# Patient Record
Sex: FEMALE | Race: WHITE | Employment: FULL TIME | URBAN - METROPOLITAN AREA
[De-identification: names, ages, dates, MRNs, and addresses within clinical notes are randomized per-mention and may not be internally consistent; named-entity substitution may affect disease eponyms.]

---

## 2017-09-23 ENCOUNTER — HOSPITAL ENCOUNTER (OUTPATIENT)
Age: 42
Discharge: HOME | End: 2017-09-23
Attending: FAMILY MEDICINE
Payer: COMMERCIAL

## 2017-09-23 VITALS
HEART RATE: 86 BPM | SYSTOLIC BLOOD PRESSURE: 96 MMHG | DIASTOLIC BLOOD PRESSURE: 70 MMHG | TEMPERATURE: 97 F | OXYGEN SATURATION: 100 % | RESPIRATION RATE: 20 BRPM

## 2017-09-23 DIAGNOSIS — R53.83 FATIGUE, UNSPECIFIED TYPE: ICD-10-CM

## 2017-09-23 DIAGNOSIS — R05.9 COUGH: Primary | ICD-10-CM

## 2017-09-23 PROCEDURE — 99203 OFFICE O/P NEW LOW 30 MIN: CPT

## 2017-09-23 PROCEDURE — 99204 OFFICE O/P NEW MOD 45 MIN: CPT

## 2017-09-23 RX ORDER — AZITHROMYCIN 250 MG/1
TABLET, FILM COATED ORAL
Qty: 1 PACKAGE | Refills: 0 | Status: SHIPPED | OUTPATIENT
Start: 2017-09-23 | End: 2017-09-28

## 2017-09-23 RX ORDER — ALBUTEROL SULFATE 90 UG/1
2 AEROSOL, METERED RESPIRATORY (INHALATION) EVERY 4 HOURS PRN
Qty: 1 INHALER | Refills: 0 | Status: SHIPPED | OUTPATIENT
Start: 2017-09-23 | End: 2017-10-23

## 2017-09-23 NOTE — ED INITIAL ASSESSMENT (HPI)
Pt here with a cough since Tuesday, pt states she was running low grade fever and has been feeling weak and tired

## 2017-09-23 NOTE — ED PROVIDER NOTES
Patient Seen in: [address]    History   Patient presents with:  Cough/URI    Stated Complaint: cough    HPI    Patient here with cough, congestion for 4 days. No travel, no known sick contacts.   Patient denies sig shortn murmur  EXTREMITIES: no cyanosis, clubbing or edema  GI: soft, non-tender, normal bowel sounds  SKIN: good skin turgor, no obvious rashes  Differential to include: URI vs. rhinonsinusitis vs. Bronchitis vs. Pneumonia         ED Course   Labs Reviewed - No

## 2017-09-23 NOTE — ED NOTES
Pt discharged home, prescriptions given pt instructed to follow up with primary md if symptoms to not improve

## 2017-11-02 ENCOUNTER — HOSPITAL ENCOUNTER (OUTPATIENT)
Age: 42
Discharge: HOME | End: 2017-11-02
Attending: EMERGENCY MEDICINE
Payer: COMMERCIAL

## 2017-11-02 VITALS
OXYGEN SATURATION: 100 % | HEART RATE: 75 BPM | RESPIRATION RATE: 16 BRPM | TEMPERATURE: 98 F | SYSTOLIC BLOOD PRESSURE: 119 MMHG | DIASTOLIC BLOOD PRESSURE: 73 MMHG | WEIGHT: 150 LBS

## 2017-11-02 DIAGNOSIS — J06.9 VIRAL UPPER RESPIRATORY TRACT INFECTION: Primary | ICD-10-CM

## 2017-11-02 PROCEDURE — 99213 OFFICE O/P EST LOW 20 MIN: CPT

## 2017-11-02 PROCEDURE — 99212 OFFICE O/P EST SF 10 MIN: CPT

## 2017-11-02 NOTE — ED PROVIDER NOTES
Patient Seen in: [address]    History   No chief complaint on file. Stated Complaint: ear infection    HPI    The patient has had rhinorrhea and cough for several days.   This morning she awakened with pain in her rig No respiratory distress. Musculoskeletal: Normal range of motion. No edema or tenderness. Neurological: Oriented x 3, alert. No cranial nerve deficit. motor and sensory grossly normal, mood and affect normal   Skin: Skin is warm and dry. No rash noted.

## 2017-11-02 NOTE — ED INITIAL ASSESSMENT (HPI)
Pt here to IC with c/o right side ear pain. Resp easy and regular. Pt was here on sept. 23 got an antibiotic but never filled it (just wants us to know), but since then on/off feeling of congestion, sinus pressure, not feeling well and awoke this am with

## 2017-11-08 ENCOUNTER — HOSPITAL ENCOUNTER (OUTPATIENT)
Age: 42
Discharge: HOME | End: 2017-11-08
Attending: EMERGENCY MEDICINE
Payer: COMMERCIAL

## 2017-11-08 VITALS
DIASTOLIC BLOOD PRESSURE: 75 MMHG | HEART RATE: 71 BPM | HEIGHT: 67 IN | RESPIRATION RATE: 18 BRPM | SYSTOLIC BLOOD PRESSURE: 122 MMHG | OXYGEN SATURATION: 100 % | TEMPERATURE: 98 F

## 2017-11-08 DIAGNOSIS — J20.9 ACUTE BRONCHITIS, UNSPECIFIED ORGANISM: Primary | ICD-10-CM

## 2017-11-08 PROCEDURE — 99213 OFFICE O/P EST LOW 20 MIN: CPT

## 2017-11-08 PROCEDURE — 99214 OFFICE O/P EST MOD 30 MIN: CPT

## 2017-11-08 RX ORDER — FLUTICASONE PROPIONATE 50 MCG
2 SPRAY, SUSPENSION (ML) NASAL DAILY
Qty: 16 G | Refills: 0 | Status: SHIPPED | OUTPATIENT
Start: 2017-11-08 | End: 2017-12-08

## 2017-11-08 RX ORDER — AZITHROMYCIN 250 MG/1
TABLET, FILM COATED ORAL
Qty: 1 PACKAGE | Refills: 0 | Status: SHIPPED | OUTPATIENT
Start: 2017-11-08 | End: 2018-12-18 | Stop reason: ALTCHOICE

## 2017-11-08 RX ORDER — BENZONATATE 100 MG/1
100 CAPSULE ORAL 3 TIMES DAILY PRN
Qty: 20 CAPSULE | Refills: 0 | Status: SHIPPED | OUTPATIENT
Start: 2017-11-08 | End: 2017-11-28

## 2017-11-08 NOTE — ED NOTES
Pt discharged home, prescriptions given to patient, pt instructed to follow up with primary MD if symptoms do not improve

## 2017-11-08 NOTE — ED INITIAL ASSESSMENT (HPI)
Pt here with complaints of cough and congestion for the last 2 weeks , pt states she feels tired with no energy, pt states she has taken over the counter meds and nothing is working

## 2017-11-08 NOTE — ED PROVIDER NOTES
Patient Seen in: [address]    History   Patient presents with:  Cough/URI    Stated Complaint: cough     HPI    60-year-old female patient presents her complaining of approximately 2 weeks of cough and congestion has be 2121  ------------------------------------------------------------       [facility]               Disposition and Plan     Clinical Impression:  Acute bronchitis, unspecified organism  (primary encounter diagnosis)    Disposition:  Discharge  11/8/2017  8:38 am

## 2017-12-22 ENCOUNTER — HOSPITAL ENCOUNTER (OUTPATIENT)
Age: 42
Discharge: HOME | End: 2017-12-22
Attending: FAMILY MEDICINE
Payer: COMMERCIAL

## 2017-12-22 VITALS
OXYGEN SATURATION: 100 % | SYSTOLIC BLOOD PRESSURE: 112 MMHG | RESPIRATION RATE: 18 BRPM | DIASTOLIC BLOOD PRESSURE: 72 MMHG | TEMPERATURE: 97 F | WEIGHT: 155 LBS | HEIGHT: 67 IN | BODY MASS INDEX: 24.33 KG/M2 | HEART RATE: 72 BPM

## 2017-12-22 DIAGNOSIS — J02.0 STREPTOCOCCAL SORE THROAT: Primary | ICD-10-CM

## 2017-12-22 PROCEDURE — 99214 OFFICE O/P EST MOD 30 MIN: CPT

## 2017-12-22 PROCEDURE — 87430 STREP A AG IA: CPT

## 2017-12-22 PROCEDURE — 99213 OFFICE O/P EST LOW 20 MIN: CPT

## 2017-12-22 RX ORDER — AMOXICILLIN AND CLAVULANATE POTASSIUM 875; 125 MG/1; MG/1
1 TABLET, FILM COATED ORAL 2 TIMES DAILY
Qty: 20 TABLET | Refills: 0 | Status: SHIPPED | OUTPATIENT
Start: 2017-12-22 | End: 2018-01-01

## 2017-12-22 NOTE — ED PROVIDER NOTES
Patient Seen in: [address]    History   Patient presents with:  Sore Throat    Stated Complaint: sore throat    HPI    40year old patient without a significant PMHx presents with  sore throat for several days.   Pain with SpO2 100%   BMI 24.28 kg/m²     GENERAL: NAD, well hydrated, no stridor, appears comfortable  EYES: sclera non icteric bilateral, conjunctiva clear, no discharge, EOMI, PERRLA  NOSE: nasal turbinates mildly enlarged and erythematous.    NECK: supple, + ante

## 2018-01-19 ENCOUNTER — HOSPITAL ENCOUNTER (OUTPATIENT)
Age: 43
Discharge: HOME | End: 2018-01-19
Attending: EMERGENCY MEDICINE
Payer: COMMERCIAL

## 2018-01-19 VITALS
BODY MASS INDEX: 23 KG/M2 | OXYGEN SATURATION: 100 % | TEMPERATURE: 97 F | DIASTOLIC BLOOD PRESSURE: 61 MMHG | RESPIRATION RATE: 22 BRPM | HEART RATE: 73 BPM | WEIGHT: 150 LBS | SYSTOLIC BLOOD PRESSURE: 113 MMHG

## 2018-01-19 DIAGNOSIS — J11.1 INFLUENZA-LIKE ILLNESS: Primary | ICD-10-CM

## 2018-01-19 PROCEDURE — 99214 OFFICE O/P EST MOD 30 MIN: CPT

## 2018-01-19 PROCEDURE — 99213 OFFICE O/P EST LOW 20 MIN: CPT

## 2018-01-19 RX ORDER — BENZONATATE 200 MG/1
200 CAPSULE ORAL 3 TIMES DAILY PRN
Qty: 15 CAPSULE | Refills: 0 | Status: SHIPPED | OUTPATIENT
Start: 2018-01-19 | End: 2018-12-18 | Stop reason: ALTCHOICE

## 2018-01-19 RX ORDER — PROMETHAZINE HYDROCHLORIDE AND CODEINE PHOSPHATE 6.25; 1 MG/5ML; MG/5ML
5 SYRUP ORAL EVERY 6 HOURS PRN
Qty: 120 ML | Refills: 0 | Status: SHIPPED | OUTPATIENT
Start: 2018-01-19 | End: 2018-12-18 | Stop reason: ALTCHOICE

## 2018-01-19 NOTE — ED PROVIDER NOTES
Patient Seen in: [address]    History   Patient presents with:  Cough/URI    Stated Complaint: headache, cough, congestion    HPI    The patient is a 27-year-old female who presents with complaints of cough, headache, f mucoid or purulent discharge  Sinuses: Nontender  Pharynx: No erythema or exudate, uvula midline, no drooling trismus or stridor  Neck: Supple without palpable adenopathy  CV: Regular rate and rhythm no murmur  Respiratory: Clear to auscultation bilaterall

## 2018-01-19 NOTE — ED INITIAL ASSESSMENT (HPI)
Dry cough, headache, fatigue, nasal congestion. Symptoms since Monday. Pt denies fever, chills, bodyaches.

## 2018-12-18 ENCOUNTER — HOSPITAL ENCOUNTER (OUTPATIENT)
Age: 43
Discharge: HOME | End: 2018-12-18
Attending: FAMILY MEDICINE
Payer: COMMERCIAL

## 2018-12-18 VITALS
WEIGHT: 155 LBS | HEIGHT: 67 IN | BODY MASS INDEX: 24.33 KG/M2 | SYSTOLIC BLOOD PRESSURE: 104 MMHG | TEMPERATURE: 98 F | OXYGEN SATURATION: 100 % | HEART RATE: 72 BPM | DIASTOLIC BLOOD PRESSURE: 68 MMHG | RESPIRATION RATE: 20 BRPM

## 2018-12-18 DIAGNOSIS — J02.0 STREPTOCOCCAL SORE THROAT: Primary | ICD-10-CM

## 2018-12-18 PROCEDURE — 99214 OFFICE O/P EST MOD 30 MIN: CPT

## 2018-12-18 PROCEDURE — 87430 STREP A AG IA: CPT

## 2018-12-18 PROCEDURE — 99213 OFFICE O/P EST LOW 20 MIN: CPT

## 2018-12-18 RX ORDER — AMOXICILLIN 875 MG/1
875 TABLET, COATED ORAL 2 TIMES DAILY
Qty: 20 TABLET | Refills: 0 | Status: SHIPPED | OUTPATIENT
Start: 2018-12-18 | End: 2018-12-28

## 2018-12-18 NOTE — ED PROVIDER NOTES
Patient presents with:  Sore Throat  Cough/URI      HPI:     Darlingkarolina Ivory is a 44year old female who presents with for chief complaint of nasal congestion, sore throat, fatigue, mild cough. X 1 WEEK.     The patient denies complaints of  neck pain, ea Abdomen: soft, non-tender; bowel sounds normal; no masses,  no organomegaly  Skin: Skin color, texture, turgor normal. No rashes or lesions      Assessment/Plan:     Labs performed this visit:  Recent Results (from the past 10 hour(s))   [facility] POCT RAPID STR

## 2018-12-18 NOTE — ED NOTES
Pt discharged to care of self. Pt assessed by MD. All orders completed and acknowledged. Pt new medication and after care discussed, all questions answered. Pt confirmed understanding.

## 2018-12-18 NOTE — ED INITIAL ASSESSMENT (HPI)
Pt states havig nausea on Tuesday and Thursday but no vomiting or diarrhea. Nausea subsided but then had chills for the 3 following day. That has since subsided as well but pt now has sore throat ear ache and head congestion/with slight cough.  Pt denies fe

## 2018-12-22 ENCOUNTER — HOSPITAL ENCOUNTER (OUTPATIENT)
Age: 43
Discharge: HOME | End: 2018-12-22
Attending: FAMILY MEDICINE
Payer: COMMERCIAL

## 2018-12-22 VITALS
TEMPERATURE: 98 F | HEART RATE: 55 BPM | DIASTOLIC BLOOD PRESSURE: 74 MMHG | SYSTOLIC BLOOD PRESSURE: 116 MMHG | RESPIRATION RATE: 16 BRPM | OXYGEN SATURATION: 97 %

## 2018-12-22 DIAGNOSIS — J02.0 STREPTOCOCCAL SORE THROAT: Primary | ICD-10-CM

## 2018-12-22 PROCEDURE — 99213 OFFICE O/P EST LOW 20 MIN: CPT

## 2018-12-22 PROCEDURE — 36415 COLL VENOUS BLD VENIPUNCTURE: CPT

## 2018-12-22 PROCEDURE — 85025 COMPLETE CBC W/AUTO DIFF WBC: CPT | Performed by: FAMILY MEDICINE

## 2018-12-22 PROCEDURE — 87430 STREP A AG IA: CPT

## 2018-12-22 NOTE — ED INITIAL ASSESSMENT (HPI)
Pt was seen the morning of the 18th with a sore throat, pt was positive for strep pt was put on amoxicillin 875 and has been taking it now for 4 days and symptoms still persist. Pt fever or NVD.

## 2018-12-22 NOTE — ED PROVIDER NOTES
Pt seen in [address] [place] [address]      Stated Complaint: Patient presents with:  Sore Throat      --------------   RN assessment:     st  --------------    CC:     HPI:  Araceli Cantor is a 44year old female --returns, dx here w/ palpitations  Gastrointestinal: negative for  jaundice and odynophagia  Genitourinary:negative for decreased stream and nocturia  Behavioral/Psych: negative for aggressive behavior and hallucinations  10 point review of systems is otherwise negative.     Ob leukocytosis)  D/w: primary tx concerns for strep: RF proph>acute illness (barring p/t abscess/toxicity, etc...)  Therapeutic plan as noted: cpm amox, salt gargles  All questions answered, pt verbalizes understanding  F/u w/PCP advised  *to ER if symptoms

## 2019-01-08 ENCOUNTER — OFFICE VISIT (OUTPATIENT)
Dept: URGENT CARE | Facility: CLINIC | Age: 44
End: 2019-01-08
Payer: COMMERCIAL

## 2019-01-08 VITALS
HEART RATE: 87 BPM | BODY MASS INDEX: 25.11 KG/M2 | RESPIRATION RATE: 12 BRPM | OXYGEN SATURATION: 99 % | HEIGHT: 67 IN | SYSTOLIC BLOOD PRESSURE: 125 MMHG | WEIGHT: 160 LBS | DIASTOLIC BLOOD PRESSURE: 77 MMHG | TEMPERATURE: 98 F

## 2019-01-08 DIAGNOSIS — J32.1 FRONTAL SINUSITIS, UNSPECIFIED CHRONICITY: Primary | ICD-10-CM

## 2019-01-08 DIAGNOSIS — J02.9 SORE THROAT: ICD-10-CM

## 2019-01-08 LAB
CTP QC/QA: YES
CTP QC/QA: YES
FLUAV AG NPH QL: NEGATIVE
FLUBV AG NPH QL: NEGATIVE
S PYO RRNA THROAT QL PROBE: NEGATIVE

## 2019-01-08 PROCEDURE — 87880 POCT RAPID STREP A: ICD-10-PCS | Mod: QW,S$GLB,, | Performed by: NURSE PRACTITIONER

## 2019-01-08 PROCEDURE — 87804 POCT INFLUENZA A/B: ICD-10-PCS | Mod: QW,S$GLB,, | Performed by: NURSE PRACTITIONER

## 2019-01-08 PROCEDURE — 3008F PR BODY MASS INDEX (BMI) DOCUMENTED: ICD-10-PCS | Mod: CPTII,S$GLB,, | Performed by: NURSE PRACTITIONER

## 2019-01-08 PROCEDURE — 99214 OFFICE O/P EST MOD 30 MIN: CPT | Mod: S$GLB,,, | Performed by: NURSE PRACTITIONER

## 2019-01-08 PROCEDURE — 3008F BODY MASS INDEX DOCD: CPT | Mod: CPTII,S$GLB,, | Performed by: NURSE PRACTITIONER

## 2019-01-08 PROCEDURE — 87880 STREP A ASSAY W/OPTIC: CPT | Mod: QW,S$GLB,, | Performed by: NURSE PRACTITIONER

## 2019-01-08 PROCEDURE — 87804 INFLUENZA ASSAY W/OPTIC: CPT | Mod: QW,S$GLB,, | Performed by: NURSE PRACTITIONER

## 2019-01-08 PROCEDURE — 99214 PR OFFICE/OUTPT VISIT, EST, LEVL IV, 30-39 MIN: ICD-10-PCS | Mod: S$GLB,,, | Performed by: NURSE PRACTITIONER

## 2019-01-08 RX ORDER — AMOXICILLIN 875 MG/1
875 TABLET, FILM COATED ORAL 2 TIMES DAILY
Qty: 20 TABLET | Refills: 0 | Status: SHIPPED | OUTPATIENT
Start: 2019-01-08 | End: 2019-01-18

## 2019-01-08 NOTE — PROGRESS NOTES
"Subjective:       Patient ID: Johana Welsh is a 43 y.o. female.    Vitals:  height is 5' 7" (1.702 m) and weight is 72.6 kg (160 lb). Her oral temperature is 98.3 °F (36.8 °C). Her blood pressure is 125/77 and her pulse is 87. Her respiration is 12 and oxygen saturation is 99%.     Chief Complaint: Sore Throat    Symptoms started on Friday with fatigue, chill, sore throat, and headache. She has tried Sudafed & Advil, and states no relief. Not local.      Sore Throat    This is a new problem. The current episode started in the past 7 days. The problem has been gradually worsening. The pain is worse on the left side. There has been no fever. The pain is at a severity of 7/10. The pain is moderate. Associated symptoms include headaches, swollen glands and trouble swallowing. Pertinent negatives include no abdominal pain, congestion, coughing, diarrhea, drooling, ear discharge, ear pain, hoarse voice, plugged ear sensation, neck pain, shortness of breath, stridor or vomiting. She has had no exposure to strep or mono. She has tried NSAIDs for the symptoms. The treatment provided no relief.       Constitution: Positive for fatigue. Negative for chills, sweating and fever.   HENT: Positive for sinus pain, sinus pressure, sore throat, trouble swallowing and voice change. Negative for ear pain, ear discharge, drooling and congestion.    Neck: Positive for painful lymph nodes. Negative for neck pain.   Eyes: Negative for eye redness.   Respiratory: Negative for chest tightness, cough, sputum production, bloody sputum, COPD, shortness of breath, stridor, wheezing and asthma.    Gastrointestinal: Negative for abdominal pain, nausea, vomiting and diarrhea.   Musculoskeletal: Negative for muscle ache.   Skin: Negative for rash.   Allergic/Immunologic: Negative for seasonal allergies and asthma.   Neurological: Positive for headaches.   Hematologic/Lymphatic: Positive for swollen lymph nodes.       Objective:      Physical " Exam   Constitutional: She is oriented to person, place, and time. Vital signs are normal. She appears well-developed and well-nourished. She is cooperative.  Non-toxic appearance. She does not have a sickly appearance. She does not appear ill. No distress.   HENT:   Head: Normocephalic and atraumatic.   Right Ear: Hearing, tympanic membrane, external ear and ear canal normal.   Left Ear: Hearing, tympanic membrane, external ear and ear canal normal.   Nose: No mucosal edema, rhinorrhea or nasal deformity. No epistaxis. Right sinus exhibits frontal sinus tenderness. Right sinus exhibits no maxillary sinus tenderness. Left sinus exhibits frontal sinus tenderness. Left sinus exhibits no maxillary sinus tenderness.   Mouth/Throat: Uvula is midline and mucous membranes are normal. No trismus in the jaw. Normal dentition. No uvula swelling. Posterior oropharyngeal erythema (post nasal drainage noted) present. No posterior oropharyngeal edema. Tonsils are 1+ on the right. Tonsils are 1+ on the left. No tonsillar exudate.       Eyes: Conjunctivae, EOM and lids are normal. Pupils are equal, round, and reactive to light. No scleral icterus.   Sclera clear bilat   Neck: Trachea normal, normal range of motion, full passive range of motion without pain and phonation normal. Neck supple.   Cardiovascular: Normal rate, regular rhythm, S1 normal, S2 normal, normal heart sounds, intact distal pulses and normal pulses.   Pulmonary/Chest: Effort normal and breath sounds normal. No respiratory distress. She has no decreased breath sounds. She has no wheezes. She has no rhonchi. She has no rales.   Abdominal: Soft. Normal appearance and bowel sounds are normal. She exhibits no distension. There is no tenderness.   Musculoskeletal: Normal range of motion. She exhibits no edema or deformity.   Lymphadenopathy:     She has no cervical adenopathy.   Neurological: She is alert and oriented to person, place, and time. She exhibits normal  muscle tone. Coordination normal.   Skin: Skin is warm, dry and intact. No rash noted. She is not diaphoretic. No pallor.   Psychiatric: She has a normal mood and affect. Her speech is normal and behavior is normal. Judgment and thought content normal. Cognition and memory are normal.   Nursing note and vitals reviewed.      Results for orders placed or performed in visit on 01/08/19   POCT rapid strep A   Result Value Ref Range    Rapid Strep A Screen Negative Negative     Acceptable Yes    POCT INFLUENZA A/B   Result Value Ref Range    Rapid Influenza A Ag Negative Negative    Rapid Influenza B Ag Negative Negative     Acceptable Yes       Assessment:       1. Frontal sinusitis, unspecified chronicity    2. Sore throat        Plan:         Frontal sinusitis, unspecified chronicity  -     amoxicillin (AMOXIL) 875 MG tablet; Take 1 tablet (875 mg total) by mouth 2 (two) times daily. for 10 days  Dispense: 20 tablet; Refill: 0    Sore throat  -     POCT rapid strep A  -     POCT INFLUENZA A/B  -     (Magic mouthwash) 1:1:1 Benadryl 12.5mg/5ml liq, aluminum & magnesium hydroxide-simehticone (Maalox), lidocaine viscous 2%; Swish and spit 5 mLs every 4 (four) hours as needed. For sore throat  Dispense: 90 mL; Refill: 0      Patient Instructions                                                               Sinusitis   If your condition worsens or fails to improve we recommend that you receive another evaluation at the ER immediately or contact your PCP to discuss your concerns or return here. You must understand that you've received an urgent care treatment only and that you may be released before all your medical problems are known or treated. You the patient will arrange for followup care as instructed.   If we discussed that I think your illness is viral it will not respond to antibiotics and it will last 10-14 days. However, if over the next few days the symptoms worsen start the  "antibiotics I have given you.   -  If we discussed that you require antibiotics start them now and take them to completion.   -  If you are female and on BCP and do take the antibiotics, use additional methods to prevent pregnancy while on the antibiotics and for one cycle after.   -  Flonase (fluticasone) is a nasal spray which is available over the counter and may help with your symptoms   -  Zyrtec D, Claritin D or allegra D can also help with symptoms of congestion and drainage.   -  If you have hypertension avoid using the "D" which is the decongestant. Instead, you can use Coricidin HBP for your cold and cough symptoms.     -  If you just have drainage you can take plain Zyrtec, Claritin or Allegra   -  If you just have a congested feeling you can take pseudoephedrine (unless you have high blood pressure) which you have to sign for behind the counter. Do not buy the phenylephrine which is on the shelf as it is not effective   -  Rest and fluids are also important.   -  Tylenol or ibuprofen can also be used as directed for pain unless you have an allergy to them or medical condition such as stomach ulcers, kidney or liver disease or blood thinners etc for which you should not be taking these type of medications.   -  If you are flying in the next few days Afrin nose drops for the airplane flight upon take off and landing may help. Other than at those times refrain from using afrin.   -  If you were prescribed a narcotic do not drive or operate heavy machinery while taking these medications.                                                           Pharyngitis   If your condition worsens or fails to improve we recommend that you receive another evaluation at the ER immediately or contact your PCP to discuss your concerns or return here. You must understand that you've received an urgent care treatment only and that you may be released before all your medical problems are known or treated. You the patient will " arrange for followup care as instructed.   The majority of all sore throats or tonsillitis are viral and antibiotics will not treat this.     If the strep test performed in office was Positive:  - Complete the full course of antibiotics given  - Drink plenty of cool liquids while avoid any beverage or food that can irritate your  throat (acidic, spicy or salty foods).  - Throw away your toothbrush now and when you complete your antibiotics.    If the strep culture was done and returns negative in 3-5 days and you are still having a sore throat, you may need to get a mono spot test done or repeated.   Tylenol or ibuprofen for pain may help as long as you are not allergic to these meds or have a medical condition such as stomach ulcers, liver or kidney disease or taking blood thinners etc that would   prevent you from using these medications.   Rest and fluids will help as well.   If you were prescribed antibiotics and are female and on BCP use additional methods to prevent pregnancy while on the antibiotics and for one cycle after.     Sinusitis (Antibiotic Treatment)    The sinuses are air-filled spaces within the bones of the face. They connect to the inside of the nose. Sinusitis is an inflammation of the tissue lining the sinus cavity. Sinus inflammation can occur during a cold. It can also be due to allergies to pollens and other particles in the air. Sinusitis can cause symptoms of sinus congestion and fullness. A sinus infection causes fever, headache and facial pain. There is often green or yellow drainage from the nose or into the back of the throat (post-nasal drip). You have been given antibiotics to treat this condition.  Home care:  · Take the full course of antibiotics as instructed. Do not stop taking them, even if you feel better.  · Drink plenty of water, hot tea, and other liquids. This may help thin mucus. It also may promote sinus drainage.  · Heat may help soothe painful areas of the face. Use a  towel soaked in hot water. Or,  the shower and direct the hot spray onto your face. Using a vaporizer along with a menthol rub at night may also help.   · An expectorant containing guaifenesin may help thin the mucus and promote drainage from the sinuses.  · Over-the-counter decongestants may be used unless a similar medicine was prescribed. Nasal sprays work the fastest. Use one that contains phenylephrine or oxymetazoline. First blow the nose gently. Then use the spray. Do not use these medicines more often than directed on the label or symptoms may get worse. You may also use tablets containing pseudoephedrine. Avoid products that combine ingredients, because side effects may be increased. Read labels. You can also ask the pharmacist for help. (NOTE: Persons with high blood pressure should not use decongestants. They can raise blood pressure.)  · Over-the-counter antihistamines may help if allergies contributed to your sinusitis.    · Do not use nasal rinses or irrigation during an acute sinus infection, unless told to by your health care provider. Rinsing may spread the infection to other sinuses.  · Use acetaminophen or ibuprofen to control pain, unless another pain medicine was prescribed. (If you have chronic liver or kidney disease or ever had a stomach ulcer, talk with your doctor before using these medicines. Aspirin should never be used in anyone under 18 years of age who is ill with a fever. It may cause severe liver damage.)  · Don't smoke. This can worsen symptoms.  Follow-up care  Follow up with your healthcare provider or our staff if you are not improving within the next week.  When to seek medical advice  Call your healthcare provider if any of these occur:  · Facial pain or headache becoming more severe  · Stiff neck  · Unusual drowsiness or confusion  · Swelling of the forehead or eyelids  · Vision problems, including blurred or double vision  · Fever of 100.4ºF (38ºC) or higher, or as  directed by your healthcare provider  · Seizure  · Breathing problems  · Symptoms not resolving within 10 days  Date Last Reviewed: 4/13/2015  © 2118-5994 [employer]. [address] [place] [address], [place] [address]. All rights reserved. This information is not intended as a substitute for professional medical care. Always follow your healthcare professional's instructions.        Self-Care for Sore Throats    Sore throats happen for many reasons, such as colds, allergies, and infections caused by viruses or bacteria. In any case, your throat becomes red and sore. Your goal for self-care is to reduce your discomfort while giving your throat a chance to heal.  Moisten and soothe your throat  Tips include the following:  · Try a sip of water first thing after waking up.  · Keep your throat moist by drinking 6 or more glasses of clear liquids every day.  · Run a cool-air humidifier in your room overnight.  · Avoid cigarette smoke.   · Suck on throat lozenges, cough drops, hard candy, ice chips, or frozen fruit-juice bars. Use the sugar-free versions if your diet or medical condition requires them.  Gargle to ease irritation  Gargling every hour or 2 can ease irritation. Try gargling with 1 of these solutions:  · 1/4 teaspoon of salt in 1/2 cup of warm water  · An over-the-counter anesthetic gargle  Use medicine for more relief  Over-the-counter medicine can reduce sore throat symptoms. Ask your pharmacist if you have questions about which medicine to use:  · Ease pain with anesthetic sprays. Aspirin or an aspirin substitute also helps. Remember, never give aspirin to anyone 18 or younger, or if you are already taking blood thinners.   · For sore throats caused by allergies, try antihistamines to block the allergic reaction.  · Remember: unless a sore throat is caused by a bacterial infection, antibiotics wont help you.  Prevent future sore throats  Prevention tips include the following:  · Stop smoking or  reduce contact with secondhand smoke. Smoke irritates the tender throat lining.  · Limit contact with pets and with allergy-causing substances, such as pollen and mold.  · When youre around someone with a sore throat or cold, wash your hands often to keep viruses or bacteria from spreading.  · Dont strain your vocal cords.  Call your healthcare provider  Contact your healthcare provider if you have:  · A temperature over 101°F (38.3°C)  · White spots on the throat  · Great difficulty swallowing  · Trouble breathing  · A skin rash  · Recent exposure to someone else with strep bacteria  · Severe hoarseness and swollen glands in the neck or jaw   Date Last Reviewed: 8/1/2016  © 8198-1437 [employer]. [address] [place] [address], [place] [address]. All rights reserved. This information is not intended as a substitute for professional medical care. Always follow your healthcare professional's instructions.

## 2019-01-08 NOTE — PATIENT INSTRUCTIONS
"                                                          Sinusitis   If your condition worsens or fails to improve we recommend that you receive another evaluation at the ER immediately or contact your PCP to discuss your concerns or return here. You must understand that you've received an urgent care treatment only and that you may be released before all your medical problems are known or treated. You the patient will arrange for followup care as instructed.   If we discussed that I think your illness is viral it will not respond to antibiotics and it will last 10-14 days. However, if over the next few days the symptoms worsen start the antibiotics I have given you.   -  If we discussed that you require antibiotics start them now and take them to completion.   -  If you are female and on BCP and do take the antibiotics, use additional methods to prevent pregnancy while on the antibiotics and for one cycle after.   -  Flonase (fluticasone) is a nasal spray which is available over the counter and may help with your symptoms   -  Zyrtec D, Claritin D or allegra D can also help with symptoms of congestion and drainage.   -  If you have hypertension avoid using the "D" which is the decongestant. Instead, you can use Coricidin HBP for your cold and cough symptoms.     -  If you just have drainage you can take plain Zyrtec, Claritin or Allegra   -  If you just have a congested feeling you can take pseudoephedrine (unless you have high blood pressure) which you have to sign for behind the counter. Do not buy the phenylephrine which is on the shelf as it is not effective   -  Rest and fluids are also important.   -  Tylenol or ibuprofen can also be used as directed for pain unless you have an allergy to them or medical condition such as stomach ulcers, kidney or liver disease or blood thinners etc for which you should not be taking these type of medications.   -  If you are flying in the next few days Afrin nose drops for " the airplane flight upon take off and landing may help. Other than at those times refrain from using afrin.   -  If you were prescribed a narcotic do not drive or operate heavy machinery while taking these medications.                                                           Pharyngitis   If your condition worsens or fails to improve we recommend that you receive another evaluation at the ER immediately or contact your PCP to discuss your concerns or return here. You must understand that you've received an urgent care treatment only and that you may be released before all your medical problems are known or treated. You the patient will arrange for followup care as instructed.   The majority of all sore throats or tonsillitis are viral and antibiotics will not treat this.     If the strep test performed in office was Positive:  - Complete the full course of antibiotics given  - Drink plenty of cool liquids while avoid any beverage or food that can irritate your  throat (acidic, spicy or salty foods).  - Throw away your toothbrush now and when you complete your antibiotics.    If the strep culture was done and returns negative in 3-5 days and you are still having a sore throat, you may need to get a mono spot test done or repeated.   Tylenol or ibuprofen for pain may help as long as you are not allergic to these meds or have a medical condition such as stomach ulcers, liver or kidney disease or taking blood thinners etc that would   prevent you from using these medications.   Rest and fluids will help as well.   If you were prescribed antibiotics and are female and on BCP use additional methods to prevent pregnancy while on the antibiotics and for one cycle after.     Sinusitis (Antibiotic Treatment)    The sinuses are air-filled spaces within the bones of the face. They connect to the inside of the nose. Sinusitis is an inflammation of the tissue lining the sinus cavity. Sinus inflammation can occur during a cold.  It can also be due to allergies to pollens and other particles in the air. Sinusitis can cause symptoms of sinus congestion and fullness. A sinus infection causes fever, headache and facial pain. There is often green or yellow drainage from the nose or into the back of the throat (post-nasal drip). You have been given antibiotics to treat this condition.  Home care:  · Take the full course of antibiotics as instructed. Do not stop taking them, even if you feel better.  · Drink plenty of water, hot tea, and other liquids. This may help thin mucus. It also may promote sinus drainage.  · Heat may help soothe painful areas of the face. Use a towel soaked in hot water. Or,  the shower and direct the hot spray onto your face. Using a vaporizer along with a menthol rub at night may also help.   · An expectorant containing guaifenesin may help thin the mucus and promote drainage from the sinuses.  · Over-the-counter decongestants may be used unless a similar medicine was prescribed. Nasal sprays work the fastest. Use one that contains phenylephrine or oxymetazoline. First blow the nose gently. Then use the spray. Do not use these medicines more often than directed on the label or symptoms may get worse. You may also use tablets containing pseudoephedrine. Avoid products that combine ingredients, because side effects may be increased. Read labels. You can also ask the pharmacist for help. (NOTE: Persons with high blood pressure should not use decongestants. They can raise blood pressure.)  · Over-the-counter antihistamines may help if allergies contributed to your sinusitis.    · Do not use nasal rinses or irrigation during an acute sinus infection, unless told to by your health care provider. Rinsing may spread the infection to other sinuses.  · Use acetaminophen or ibuprofen to control pain, unless another pain medicine was prescribed. (If you have chronic liver or kidney disease or ever had a stomach ulcer, talk  with your doctor before using these medicines. Aspirin should never be used in anyone under 18 years of age who is ill with a fever. It may cause severe liver damage.)  · Don't smoke. This can worsen symptoms.  Follow-up care  Follow up with your healthcare provider or our staff if you are not improving within the next week.  When to seek medical advice  Call your healthcare provider if any of these occur:  · Facial pain or headache becoming more severe  · Stiff neck  · Unusual drowsiness or confusion  · Swelling of the forehead or eyelids  · Vision problems, including blurred or double vision  · Fever of 100.4ºF (38ºC) or higher, or as directed by your healthcare provider  · Seizure  · Breathing problems  · Symptoms not resolving within 10 days  Date Last Reviewed: 4/13/2015 © 2000-2017 [employer]. [address] [place] [address], [place] [address]. All rights reserved. This information is not intended as a substitute for professional medical care. Always follow your healthcare professional's instructions.        Self-Care for Sore Throats    Sore throats happen for many reasons, such as colds, allergies, and infections caused by viruses or bacteria. In any case, your throat becomes red and sore. Your goal for self-care is to reduce your discomfort while giving your throat a chance to heal.  Moisten and soothe your throat  Tips include the following:  · Try a sip of water first thing after waking up.  · Keep your throat moist by drinking 6 or more glasses of clear liquids every day.  · Run a cool-air humidifier in your room overnight.  · Avoid cigarette smoke.   · Suck on throat lozenges, cough drops, hard candy, ice chips, or frozen fruit-juice bars. Use the sugar-free versions if your diet or medical condition requires them.  Gargle to ease irritation  Gargling every hour or 2 can ease irritation. Try gargling with 1 of these solutions:  · 1/4 teaspoon of salt in 1/2 cup of warm water  · An  over-the-counter anesthetic gargle  Use medicine for more relief  Over-the-counter medicine can reduce sore throat symptoms. Ask your pharmacist if you have questions about which medicine to use:  · Ease pain with anesthetic sprays. Aspirin or an aspirin substitute also helps. Remember, never give aspirin to anyone 18 or younger, or if you are already taking blood thinners.   · For sore throats caused by allergies, try antihistamines to block the allergic reaction.  · Remember: unless a sore throat is caused by a bacterial infection, antibiotics wont help you.  Prevent future sore throats  Prevention tips include the following:  · Stop smoking or reduce contact with secondhand smoke. Smoke irritates the tender throat lining.  · Limit contact with pets and with allergy-causing substances, such as pollen and mold.  · When youre around someone with a sore throat or cold, wash your hands often to keep viruses or bacteria from spreading.  · Dont strain your vocal cords.  Call your healthcare provider  Contact your healthcare provider if you have:  · A temperature over 101°F (38.3°C)  · White spots on the throat  · Great difficulty swallowing  · Trouble breathing  · A skin rash  · Recent exposure to someone else with strep bacteria  · Severe hoarseness and swollen glands in the neck or jaw   Date Last Reviewed: 8/1/2016  © 7670-2986 [employer]. [address] [place] [address], [place] [address]. All rights reserved. This information is not intended as a substitute for professional medical care. Always follow your healthcare professional's instructions.

## 2019-01-11 ENCOUNTER — TELEPHONE (OUTPATIENT)
Dept: URGENT CARE | Facility: CLINIC | Age: 44
End: 2019-01-11

## 2019-06-01 ENCOUNTER — HOSPITAL ENCOUNTER (OUTPATIENT)
Age: 44
Discharge: HOME | End: 2019-06-01
Attending: EMERGENCY MEDICINE
Payer: COMMERCIAL

## 2019-06-01 VITALS
DIASTOLIC BLOOD PRESSURE: 77 MMHG | RESPIRATION RATE: 18 BRPM | HEART RATE: 69 BPM | TEMPERATURE: 99 F | OXYGEN SATURATION: 100 % | SYSTOLIC BLOOD PRESSURE: 116 MMHG

## 2019-06-01 DIAGNOSIS — J40 BRONCHITIS: Primary | ICD-10-CM

## 2019-06-01 PROCEDURE — 99213 OFFICE O/P EST LOW 20 MIN: CPT

## 2019-06-01 PROCEDURE — 99214 OFFICE O/P EST MOD 30 MIN: CPT

## 2019-06-01 RX ORDER — ALBUTEROL SULFATE 90 UG/1
2 AEROSOL, METERED RESPIRATORY (INHALATION) EVERY 4 HOURS PRN
Qty: 1 INHALER | Refills: 0 | Status: SHIPPED | OUTPATIENT
Start: 2019-06-01 | End: 2019-07-01

## 2019-06-01 RX ORDER — FLUTICASONE PROPIONATE 50 MCG
2 SPRAY, SUSPENSION (ML) NASAL DAILY
Qty: 16 G | Refills: 0 | Status: SHIPPED | OUTPATIENT
Start: 2019-06-01 | End: 2019-07-01

## 2019-06-01 RX ORDER — AZITHROMYCIN 250 MG/1
TABLET, FILM COATED ORAL
Qty: 1 PACKAGE | Refills: 0 | Status: SHIPPED | OUTPATIENT
Start: 2019-06-01 | End: 2020-02-03

## 2019-06-01 RX ORDER — BENZONATATE 100 MG/1
100 CAPSULE ORAL 3 TIMES DAILY PRN
Qty: 30 CAPSULE | Refills: 0 | Status: SHIPPED | OUTPATIENT
Start: 2019-06-01 | End: 2019-07-01

## 2019-06-01 NOTE — ED PROVIDER NOTES
Patient [address]en [address]: [address]    History   Patient presents with:  Cough/URI    Stated Complaint: Cold, fever    HPI    Patient here with cough, congestion for 7 days. No travel, no known sick contacts.   Patient denies si atraumatic  EYES: sclera non icteric bilateral, conjunctiva clear  EARS:tm's clear bilateral  NOSE: nasal turbinates boggy  NECK: supple, no adenopathy, no thyromegaly  THROAT: pnd noted, post phaynx injected,  LUNGS: no accessory use, increased upper airw

## 2019-06-01 NOTE — ED INITIAL ASSESSMENT (HPI)
Pt here with complaints of cough congestion and low grade fevers that has been going on for a week now, pt states she feels fatigued

## 2019-10-09 ENCOUNTER — HOSPITAL ENCOUNTER (OUTPATIENT)
Age: 44
Discharge: HOME | End: 2019-10-09
Attending: FAMILY MEDICINE
Payer: COMMERCIAL

## 2019-10-09 VITALS
HEIGHT: 67 IN | BODY MASS INDEX: 25.11 KG/M2 | RESPIRATION RATE: 20 BRPM | TEMPERATURE: 98 F | WEIGHT: 160 LBS | DIASTOLIC BLOOD PRESSURE: 71 MMHG | HEART RATE: 70 BPM | SYSTOLIC BLOOD PRESSURE: 116 MMHG | OXYGEN SATURATION: 100 %

## 2019-10-09 DIAGNOSIS — J01.10 ACUTE NON-RECURRENT FRONTAL SINUSITIS: Primary | ICD-10-CM

## 2019-10-09 PROCEDURE — 99213 OFFICE O/P EST LOW 20 MIN: CPT

## 2019-10-09 PROCEDURE — 99214 OFFICE O/P EST MOD 30 MIN: CPT

## 2019-10-09 RX ORDER — AMOXICILLIN AND CLAVULANATE POTASSIUM 875; 125 MG/1; MG/1
1 TABLET, FILM COATED ORAL 2 TIMES DAILY
Qty: 14 TABLET | Refills: 0 | Status: SHIPPED | OUTPATIENT
Start: 2019-10-09 | End: 2019-10-16

## 2019-10-09 NOTE — ED INITIAL ASSESSMENT (HPI)
Pt here with c/o dry cough, congestion, runny nose and generalized body aches for about 2 weeks. Denies fever. Pt was having sinus pressure last week.  Denies sore throat

## 2019-10-09 NOTE — ED PROVIDER NOTES
Patient Seen in: [address]      History   Patient presents with:  Cough/URI    Stated Complaint: COLD    HPI    38yo F presents to IC with 2 weeks of nasal congestion, sinus pressure, sore throat and body aches.   Nasal sinus tenderness. Mouth/Throat: Uvula is midline and oropharynx is clear and moist. No oral lesions. No trismus in the jaw. No uvula swelling. Eyes: Pupils are equal, round, and reactive to light.  Conjunctivae and EOM are normal.   Neck: Normal range o

## 2019-11-23 ENCOUNTER — HOSPITAL ENCOUNTER (OUTPATIENT)
Age: 44
Discharge: HOME | End: 2019-11-23
Attending: FAMILY MEDICINE
Payer: COMMERCIAL

## 2019-11-23 VITALS
SYSTOLIC BLOOD PRESSURE: 137 MMHG | HEART RATE: 80 BPM | DIASTOLIC BLOOD PRESSURE: 71 MMHG | RESPIRATION RATE: 18 BRPM | OXYGEN SATURATION: 99 % | WEIGHT: 160 LBS | TEMPERATURE: 98 F | BODY MASS INDEX: 25.11 KG/M2 | HEIGHT: 67 IN

## 2019-11-23 DIAGNOSIS — R05.9 COUGH: Primary | ICD-10-CM

## 2019-11-23 PROCEDURE — 87430 STREP A AG IA: CPT

## 2019-11-23 PROCEDURE — 99213 OFFICE O/P EST LOW 20 MIN: CPT

## 2019-11-23 PROCEDURE — 99214 OFFICE O/P EST MOD 30 MIN: CPT

## 2019-11-23 RX ORDER — AZITHROMYCIN 250 MG/1
TABLET, FILM COATED ORAL
Qty: 1 PACKAGE | Refills: 0 | Status: SHIPPED | OUTPATIENT
Start: 2019-11-23 | End: 2019-11-28

## 2019-11-23 NOTE — ED PROVIDER NOTES
Patient presents with:  Cough/URI      HPI:     Misael Ureña is a 38year old female who presents with for chief complaint of fever, sore throat and cough. Tamx 103 yesterday. Per pt. She has had cold symptoms for past 1 week. Now feels worse.      Sulema Barthel and intact distal pulses. Exam reveals no gallop and no friction rub. No murmur heard. 4.RESPIRATORY/Pulmonary/Chest: Effort normal and breath sounds normal. No stridor. No respiratory distress. no wheezes. no rales. 5. Skin: Skin is warm and dry.

## 2019-11-23 NOTE — ED INITIAL ASSESSMENT (HPI)
Per pt having on week of cough and congestion and sore throat started yesterday. Pt reports fever this morning.

## 2019-12-04 ENCOUNTER — WALK IN (OUTPATIENT)
Dept: URGENT CARE | Age: 44
End: 2019-12-04

## 2019-12-04 VITALS
RESPIRATION RATE: 16 BRPM | BODY MASS INDEX: 25.27 KG/M2 | OXYGEN SATURATION: 98 % | TEMPERATURE: 98.1 F | WEIGHT: 161 LBS | SYSTOLIC BLOOD PRESSURE: 100 MMHG | HEART RATE: 71 BPM | HEIGHT: 67 IN | DIASTOLIC BLOOD PRESSURE: 66 MMHG

## 2019-12-04 DIAGNOSIS — Z20.828 EXPOSURE TO INFLUENZA: Primary | ICD-10-CM

## 2019-12-04 PROCEDURE — 99202 OFFICE O/P NEW SF 15 MIN: CPT | Performed by: NURSE PRACTITIONER

## 2019-12-04 RX ORDER — MEBENDAZOLE 100 MG/1
TABLET, CHEWABLE ORAL
COMMUNITY
Start: 2019-12-04

## 2019-12-04 RX ORDER — OSELTAMIVIR PHOSPHATE 75 MG/1
75 CAPSULE ORAL 2 TIMES DAILY
Qty: 10 CAPSULE | Refills: 0 | Status: SHIPPED | OUTPATIENT
Start: 2019-12-04 | End: 2019-12-09

## 2019-12-04 ASSESSMENT — ENCOUNTER SYMPTOMS
SORE THROAT: 1
RESPIRATORY NEGATIVE: 1
HEMATOLOGIC/LYMPHATIC NEGATIVE: 1
GASTROINTESTINAL NEGATIVE: 1
EYES NEGATIVE: 1
NEUROLOGICAL NEGATIVE: 1
PSYCHIATRIC NEGATIVE: 1
ENDOCRINE NEGATIVE: 1
CONSTITUTIONAL NEGATIVE: 1
ALLERGIC/IMMUNOLOGIC NEGATIVE: 1

## 2020-02-03 ENCOUNTER — OFFICE VISIT (OUTPATIENT)
Dept: OBGYN CLINIC | Facility: CLINIC | Age: 45
End: 2020-02-03
Payer: COMMERCIAL

## 2020-02-03 VITALS
WEIGHT: 162 LBS | HEIGHT: 67 IN | DIASTOLIC BLOOD PRESSURE: 78 MMHG | SYSTOLIC BLOOD PRESSURE: 112 MMHG | BODY MASS INDEX: 25.43 KG/M2

## 2020-02-03 DIAGNOSIS — N92.6 IRREGULAR MENSES: Primary | ICD-10-CM

## 2020-02-03 PROBLEM — Z15.09 MONOALLELIC MUTATION OF CHEK2 GENE IN FEMALE PATIENT: Status: ACTIVE | Noted: 2019-06-27

## 2020-02-03 PROBLEM — Z15.89 MONOALLELIC MUTATION OF CHEK2 GENE IN FEMALE PATIENT: Status: ACTIVE | Noted: 2019-06-27

## 2020-02-03 PROBLEM — Z15.02 MONOALLELIC MUTATION OF CHEK2 GENE IN FEMALE PATIENT: Status: ACTIVE | Noted: 2019-06-27

## 2020-02-03 PROBLEM — Z15.01 MONOALLELIC MUTATION OF CHEK2 GENE IN FEMALE PATIENT: Status: ACTIVE | Noted: 2019-06-27

## 2020-02-03 PROCEDURE — 99204 OFFICE O/P NEW MOD 45 MIN: CPT | Performed by: OBSTETRICS & GYNECOLOGY

## 2020-02-03 RX ORDER — LACTOBACILLUS ACIDOPHILUS 500MM CELL
CAPSULE ORAL
COMMUNITY

## 2020-02-03 RX ORDER — PYRIDOXINE HCL (VITAMIN B6) 100 MG
TABLET ORAL
COMMUNITY

## 2020-02-03 NOTE — PROGRESS NOTES
GYN H&P     2/3/2020  10:50 AM    CC: Patient is here for irregular menses    HPI: Patient is a 38year old  for abnormal periods. Her periods have become irregular menses q 3 -5 W with heavier bleeding and worsening cramps for about a year.  Her per 13 oz (3.09 kg) F Caesarean   ZO      Birth Comments: Breech       GYN hx:    Hx Prior Abnormal Pap: No      LPS: 10/2018 normal per pt  Hx of abnormal paps no    CONTRACEPTION: nothing  SEXUALLY ACTIVE: yes  CONDOM USE: no  HPV VACCINE na  LAST MAMMOGRAM distribution   Urethral meatus appear wnl, no abnormal discharge or lesions noted. Bladder: well supported, urethra wnl, no palpable tenderness or masses, no discharge  Vagina: normal pink mucosa, no lesions, normal clear discharge.    Uterus: midline, mo

## 2020-02-19 ENCOUNTER — ULTRASOUND ENCOUNTER (OUTPATIENT)
Dept: OBGYN CLINIC | Facility: CLINIC | Age: 45
End: 2020-02-19
Payer: COMMERCIAL

## 2020-02-19 DIAGNOSIS — N92.6 IRREGULAR MENSES: ICD-10-CM

## 2020-02-19 PROCEDURE — 76856 US EXAM PELVIC COMPLETE: CPT | Performed by: OBSTETRICS & GYNECOLOGY

## 2020-02-27 ENCOUNTER — TELEPHONE (OUTPATIENT)
Dept: OBGYN CLINIC | Facility: CLINIC | Age: 45
End: 2020-02-27

## 2020-02-27 DIAGNOSIS — N92.0 MENORRHAGIA WITH REGULAR CYCLE: Primary | ICD-10-CM

## 2020-02-28 NOTE — TELEPHONE ENCOUNTER
Pt returning LC's call about USN results and plan of care. Pt informed that there are not specific notes on results and plan of care and that this RN will consult with LC and have her get back to the pt when she can.  Pt verbalized understanding and agrees

## 2020-03-10 NOTE — TELEPHONE ENCOUNTER
Called and dw pt usn findings of bicornuate uterus with possible polyp. I dw her option of sonohysterogram to r/o polyp. Patient is concerned about pain and would like to be asleep.  I dw her option of hysteroscopy with possible endometrial ablation or Rhonda

## 2020-03-16 ENCOUNTER — TELEPHONE (OUTPATIENT)
Dept: OBGYN CLINIC | Facility: CLINIC | Age: 45
End: 2020-03-16

## 2020-03-16 NOTE — TELEPHONE ENCOUNTER
LM to inform patient that her elective surgery for 3/30/20 is canceled due to covid-19 precautions. Asked patient to call with any questions.

## 2020-06-01 ENCOUNTER — TELEPHONE (OUTPATIENT)
Dept: OBGYN CLINIC | Facility: CLINIC | Age: 45
End: 2020-06-01

## 2020-06-24 NOTE — TELEPHONE ENCOUNTER
Patient called back and would like to reschedule surgery for August.  Gave patient August 10 or 19. Patient can do either. Told patient will get back to her with the date that works for Dr. Tiffanie Draper. Patient verbalized understanding.

## 2020-07-31 ENCOUNTER — TELEPHONE (OUTPATIENT)
Dept: OBGYN CLINIC | Facility: CLINIC | Age: 45
End: 2020-07-31

## 2020-07-31 NOTE — TELEPHONE ENCOUNTER
LM to ask patient if she is able to switch surgery from 8/10 to 8/13 due to changes in the doctors August schedule.

## 2020-08-03 NOTE — TELEPHONE ENCOUNTER
Spoke to patient and patient said that 8/13 will work for her for surgery. Patient has appointment tomorrow to see Dr. Lolis Jones for presurgical appointment.

## 2020-08-04 ENCOUNTER — LAB ENCOUNTER (OUTPATIENT)
Dept: LAB | Facility: REFERENCE LAB | Age: 45
End: 2020-08-04
Attending: OBSTETRICS & GYNECOLOGY
Payer: COMMERCIAL

## 2020-08-04 ENCOUNTER — OFFICE VISIT (OUTPATIENT)
Dept: OBGYN CLINIC | Facility: CLINIC | Age: 45
End: 2020-08-04
Payer: COMMERCIAL

## 2020-08-04 VITALS
DIASTOLIC BLOOD PRESSURE: 76 MMHG | BODY MASS INDEX: 25.11 KG/M2 | SYSTOLIC BLOOD PRESSURE: 116 MMHG | HEIGHT: 67 IN | WEIGHT: 160 LBS

## 2020-08-04 DIAGNOSIS — N92.0 MENORRHAGIA WITH REGULAR CYCLE: Primary | ICD-10-CM

## 2020-08-04 DIAGNOSIS — N92.6 IRREGULAR MENSES: ICD-10-CM

## 2020-08-04 LAB
BASOPHILS # BLD AUTO: 0.04 X10(3) UL (ref 0–0.2)
BASOPHILS NFR BLD AUTO: 0.5 %
DEPRECATED RDW RBC AUTO: 41.4 FL (ref 35.1–46.3)
EOSINOPHIL # BLD AUTO: 0.11 X10(3) UL (ref 0–0.7)
EOSINOPHIL NFR BLD AUTO: 1.5 %
ERYTHROCYTE [DISTWIDTH] IN BLOOD BY AUTOMATED COUNT: 11.9 % (ref 11–15)
FSH SERPL-ACNC: 4.7 MIU/ML
HCT VFR BLD AUTO: 43.5 % (ref 35–48)
HGB BLD-MCNC: 14.9 G/DL (ref 12–16)
IMM GRANULOCYTES # BLD AUTO: 0.02 X10(3) UL (ref 0–1)
IMM GRANULOCYTES NFR BLD: 0.3 %
LH SERPL-ACNC: 2.2 MIU/ML
LYMPHOCYTES # BLD AUTO: 1.46 X10(3) UL (ref 1–4)
LYMPHOCYTES NFR BLD AUTO: 19.4 %
MCH RBC QN AUTO: 32.8 PG (ref 26–34)
MCHC RBC AUTO-ENTMCNC: 34.3 G/DL (ref 31–37)
MCV RBC AUTO: 95.8 FL (ref 80–100)
MONOCYTES # BLD AUTO: 0.46 X10(3) UL (ref 0.1–1)
MONOCYTES NFR BLD AUTO: 6.1 %
NEUTROPHILS # BLD AUTO: 5.42 X10 (3) UL (ref 1.5–7.7)
NEUTROPHILS # BLD AUTO: 5.42 X10(3) UL (ref 1.5–7.7)
NEUTROPHILS NFR BLD AUTO: 72.2 %
PLATELET # BLD AUTO: 221 10(3)UL (ref 150–450)
RBC # BLD AUTO: 4.54 X10(6)UL (ref 3.8–5.3)
TSI SER-ACNC: 1.47 MIU/ML (ref 0.36–3.74)
WBC # BLD AUTO: 7.5 X10(3) UL (ref 4–11)

## 2020-08-04 PROCEDURE — 83001 ASSAY OF GONADOTROPIN (FSH): CPT

## 2020-08-04 PROCEDURE — 84443 ASSAY THYROID STIM HORMONE: CPT

## 2020-08-04 PROCEDURE — 3008F BODY MASS INDEX DOCD: CPT | Performed by: OBSTETRICS & GYNECOLOGY

## 2020-08-04 PROCEDURE — 99212 OFFICE O/P EST SF 10 MIN: CPT | Performed by: OBSTETRICS & GYNECOLOGY

## 2020-08-04 PROCEDURE — 3078F DIAST BP <80 MM HG: CPT | Performed by: OBSTETRICS & GYNECOLOGY

## 2020-08-04 PROCEDURE — 36415 COLL VENOUS BLD VENIPUNCTURE: CPT

## 2020-08-04 PROCEDURE — 85025 COMPLETE CBC W/AUTO DIFF WBC: CPT

## 2020-08-04 PROCEDURE — 3074F SYST BP LT 130 MM HG: CPT | Performed by: OBSTETRICS & GYNECOLOGY

## 2020-08-04 PROCEDURE — 83002 ASSAY OF GONADOTROPIN (LH): CPT

## 2020-08-04 NOTE — PROGRESS NOTES
Here for preop for hysteroscopy on 8/13/2020. Layla Birmingham Patient was scheduled for hysteroscopic polypectomy 3/2020, but cancelled b/c of Covid. 2/2020 USN:   Uterus measures 6.25 cm in length, 7.08 cm in width, 4.44 cm in height. Small intramural myoma like ec

## 2020-08-07 RX ORDER — FAMOTIDINE 20 MG/1
20 TABLET ORAL ONCE
Status: CANCELLED | OUTPATIENT
Start: 2020-08-07 | End: 2020-08-07

## 2020-08-07 RX ORDER — METOCLOPRAMIDE 10 MG/1
10 TABLET ORAL ONCE
Status: CANCELLED | OUTPATIENT
Start: 2020-08-07 | End: 2020-08-07

## 2020-08-11 ENCOUNTER — LAB ENCOUNTER (OUTPATIENT)
Dept: LAB | Facility: HOSPITAL | Age: 45
End: 2020-08-11
Attending: OBSTETRICS & GYNECOLOGY
Payer: COMMERCIAL

## 2020-08-11 DIAGNOSIS — Z01.818 PREOP TESTING: ICD-10-CM

## 2020-08-12 LAB — SARS-COV-2 RNA RESP QL NAA+PROBE: NOT DETECTED

## 2020-08-13 ENCOUNTER — ANESTHESIA (OUTPATIENT)
Dept: SURGERY | Facility: HOSPITAL | Age: 45
End: 2020-08-13
Payer: COMMERCIAL

## 2020-08-13 ENCOUNTER — ANESTHESIA EVENT (OUTPATIENT)
Dept: SURGERY | Facility: HOSPITAL | Age: 45
End: 2020-08-13
Payer: COMMERCIAL

## 2020-08-13 ENCOUNTER — HOSPITAL ENCOUNTER (OUTPATIENT)
Facility: HOSPITAL | Age: 45
Setting detail: HOSPITAL OUTPATIENT SURGERY
Discharge: HOME | End: 2020-08-13
Attending: OBSTETRICS & GYNECOLOGY | Admitting: OBSTETRICS & GYNECOLOGY
Payer: COMMERCIAL

## 2020-08-13 VITALS
WEIGHT: 165 LBS | DIASTOLIC BLOOD PRESSURE: 67 MMHG | TEMPERATURE: 97 F | RESPIRATION RATE: 14 BRPM | SYSTOLIC BLOOD PRESSURE: 111 MMHG | HEIGHT: 67 IN | BODY MASS INDEX: 25.9 KG/M2 | OXYGEN SATURATION: 100 % | HEART RATE: 99 BPM

## 2020-08-13 DIAGNOSIS — Z01.818 PREOP TESTING: Primary | ICD-10-CM

## 2020-08-13 LAB — B-HCG UR QL: NEGATIVE

## 2020-08-13 PROCEDURE — 0UB98ZX EXCISION OF UTERUS, VIA NATURAL OR ARTIFICIAL OPENING ENDOSCOPIC, DIAGNOSTIC: ICD-10-PCS | Performed by: OBSTETRICS & GYNECOLOGY

## 2020-08-13 PROCEDURE — 58558 HYSTEROSCOPY BIOPSY: CPT | Performed by: OBSTETRICS & GYNECOLOGY

## 2020-08-13 RX ORDER — HYDROMORPHONE HYDROCHLORIDE 1 MG/ML
0.6 INJECTION, SOLUTION INTRAMUSCULAR; INTRAVENOUS; SUBCUTANEOUS EVERY 5 MIN PRN
Status: DISCONTINUED | OUTPATIENT
Start: 2020-08-13 | End: 2020-08-13

## 2020-08-13 RX ORDER — HALOPERIDOL 5 MG/ML
0.25 INJECTION INTRAMUSCULAR ONCE AS NEEDED
Status: DISCONTINUED | OUTPATIENT
Start: 2020-08-13 | End: 2020-08-13

## 2020-08-13 RX ORDER — MORPHINE SULFATE 4 MG/ML
2 INJECTION, SOLUTION INTRAMUSCULAR; INTRAVENOUS EVERY 10 MIN PRN
Status: DISCONTINUED | OUTPATIENT
Start: 2020-08-13 | End: 2020-08-13

## 2020-08-13 RX ORDER — IBUPROFEN 600 MG/1
600 TABLET ORAL EVERY 6 HOURS PRN
Qty: 30 TABLET | Refills: 0 | Status: SHIPPED | OUTPATIENT
Start: 2020-08-13

## 2020-08-13 RX ORDER — MORPHINE SULFATE 4 MG/ML
4 INJECTION, SOLUTION INTRAMUSCULAR; INTRAVENOUS EVERY 10 MIN PRN
Status: DISCONTINUED | OUTPATIENT
Start: 2020-08-13 | End: 2020-08-13

## 2020-08-13 RX ORDER — MORPHINE SULFATE 10 MG/ML
6 INJECTION, SOLUTION INTRAMUSCULAR; INTRAVENOUS EVERY 10 MIN PRN
Status: DISCONTINUED | OUTPATIENT
Start: 2020-08-13 | End: 2020-08-13

## 2020-08-13 RX ORDER — SODIUM CHLORIDE, SODIUM LACTATE, POTASSIUM CHLORIDE, CALCIUM CHLORIDE 600; 310; 30; 20 MG/100ML; MG/100ML; MG/100ML; MG/100ML
INJECTION, SOLUTION INTRAVENOUS CONTINUOUS
Status: DISCONTINUED | OUTPATIENT
Start: 2020-08-13 | End: 2020-08-13

## 2020-08-13 RX ORDER — HYDROMORPHONE HYDROCHLORIDE 1 MG/ML
0.4 INJECTION, SOLUTION INTRAMUSCULAR; INTRAVENOUS; SUBCUTANEOUS EVERY 5 MIN PRN
Status: DISCONTINUED | OUTPATIENT
Start: 2020-08-13 | End: 2020-08-13

## 2020-08-13 RX ORDER — MIDAZOLAM HYDROCHLORIDE 1 MG/ML
INJECTION INTRAMUSCULAR; INTRAVENOUS AS NEEDED
Status: DISCONTINUED | OUTPATIENT
Start: 2020-08-13 | End: 2020-08-13 | Stop reason: SURG

## 2020-08-13 RX ORDER — ONDANSETRON 4 MG/1
4 TABLET, FILM COATED ORAL EVERY 8 HOURS PRN
Status: DISCONTINUED | OUTPATIENT
Start: 2020-08-13 | End: 2020-08-13

## 2020-08-13 RX ORDER — ONDANSETRON 2 MG/ML
4 INJECTION INTRAMUSCULAR; INTRAVENOUS EVERY 8 HOURS PRN
Status: DISCONTINUED | OUTPATIENT
Start: 2020-08-13 | End: 2020-08-13

## 2020-08-13 RX ORDER — HYDROCODONE BITARTRATE AND ACETAMINOPHEN 5; 325 MG/1; MG/1
2 TABLET ORAL AS NEEDED
Status: DISCONTINUED | OUTPATIENT
Start: 2020-08-13 | End: 2020-08-13

## 2020-08-13 RX ORDER — HYDROMORPHONE HYDROCHLORIDE 1 MG/ML
0.2 INJECTION, SOLUTION INTRAMUSCULAR; INTRAVENOUS; SUBCUTANEOUS EVERY 5 MIN PRN
Status: DISCONTINUED | OUTPATIENT
Start: 2020-08-13 | End: 2020-08-13

## 2020-08-13 RX ORDER — IBUPROFEN 200 MG
200 TABLET ORAL EVERY 6 HOURS PRN
Status: DISCONTINUED | OUTPATIENT
Start: 2020-08-13 | End: 2020-08-13

## 2020-08-13 RX ORDER — LIDOCAINE HYDROCHLORIDE 10 MG/ML
INJECTION, SOLUTION EPIDURAL; INFILTRATION; INTRACAUDAL; PERINEURAL AS NEEDED
Status: DISCONTINUED | OUTPATIENT
Start: 2020-08-13 | End: 2020-08-13 | Stop reason: SURG

## 2020-08-13 RX ORDER — ONDANSETRON 2 MG/ML
4 INJECTION INTRAMUSCULAR; INTRAVENOUS ONCE AS NEEDED
Status: DISCONTINUED | OUTPATIENT
Start: 2020-08-13 | End: 2020-08-13

## 2020-08-13 RX ORDER — HYDROCODONE BITARTRATE AND ACETAMINOPHEN 5; 325 MG/1; MG/1
1 TABLET ORAL AS NEEDED
Status: DISCONTINUED | OUTPATIENT
Start: 2020-08-13 | End: 2020-08-13

## 2020-08-13 RX ORDER — IBUPROFEN 400 MG/1
400 TABLET ORAL EVERY 6 HOURS PRN
Status: DISCONTINUED | OUTPATIENT
Start: 2020-08-13 | End: 2020-08-13

## 2020-08-13 RX ORDER — ACETAMINOPHEN 500 MG
1000 TABLET ORAL ONCE
Status: COMPLETED | OUTPATIENT
Start: 2020-08-13 | End: 2020-08-13

## 2020-08-13 RX ORDER — DEXAMETHASONE SODIUM PHOSPHATE 4 MG/ML
VIAL (ML) INJECTION AS NEEDED
Status: DISCONTINUED | OUTPATIENT
Start: 2020-08-13 | End: 2020-08-13 | Stop reason: SURG

## 2020-08-13 RX ORDER — PROCHLORPERAZINE EDISYLATE 5 MG/ML
5 INJECTION INTRAMUSCULAR; INTRAVENOUS ONCE AS NEEDED
Status: DISCONTINUED | OUTPATIENT
Start: 2020-08-13 | End: 2020-08-13

## 2020-08-13 RX ORDER — ONDANSETRON 2 MG/ML
INJECTION INTRAMUSCULAR; INTRAVENOUS AS NEEDED
Status: DISCONTINUED | OUTPATIENT
Start: 2020-08-13 | End: 2020-08-13 | Stop reason: SURG

## 2020-08-13 RX ORDER — NALOXONE HYDROCHLORIDE 0.4 MG/ML
80 INJECTION, SOLUTION INTRAMUSCULAR; INTRAVENOUS; SUBCUTANEOUS AS NEEDED
Status: DISCONTINUED | OUTPATIENT
Start: 2020-08-13 | End: 2020-08-13

## 2020-08-13 RX ORDER — IBUPROFEN 600 MG/1
600 TABLET ORAL EVERY 6 HOURS PRN
Status: DISCONTINUED | OUTPATIENT
Start: 2020-08-13 | End: 2020-08-13

## 2020-08-13 RX ADMIN — ONDANSETRON 4 MG: 2 INJECTION INTRAMUSCULAR; INTRAVENOUS at 07:33:00

## 2020-08-13 RX ADMIN — MIDAZOLAM HYDROCHLORIDE 2 MG: 1 INJECTION INTRAMUSCULAR; INTRAVENOUS at 07:30:00

## 2020-08-13 RX ADMIN — LIDOCAINE HYDROCHLORIDE 50 MG: 10 INJECTION, SOLUTION EPIDURAL; INFILTRATION; INTRACAUDAL; PERINEURAL at 07:33:00

## 2020-08-13 RX ADMIN — SODIUM CHLORIDE, SODIUM LACTATE, POTASSIUM CHLORIDE, CALCIUM CHLORIDE: 600; 310; 30; 20 INJECTION, SOLUTION INTRAVENOUS at 08:06:00

## 2020-08-13 RX ADMIN — DEXAMETHASONE SODIUM PHOSPHATE 4 MG: 4 MG/ML VIAL (ML) INJECTION at 07:33:00

## 2020-08-13 RX ADMIN — SODIUM CHLORIDE, SODIUM LACTATE, POTASSIUM CHLORIDE, CALCIUM CHLORIDE: 600; 310; 30; 20 INJECTION, SOLUTION INTRAVENOUS at 07:30:00

## 2020-08-13 NOTE — H&P
GYN H&P       CC: Patient is here for irregular menses presents for hysteroscopy and possible polypectomy    HPI: Patient is a 38year old  for abnormal periods.  Her periods have become irregular menses q 3 -5 W with heavier bleeding and worsening c intolerance, no heat intolerance, no excessive urination, no excessive thirst  ALLERGIES:  No history of asthma, no hives, no eczema, no rhinitis. Patient's last menstrual period was 2020.     OB History    Para Term  AB Living   2 2 History Narrative      Live with  and 2 children      Medications reviewed.  See active list.     /65 (BP Location: Right arm)   Pulse 64   Temp 97.5 °F (36.4 °C) (Oral)   Resp 18   Ht 67\"   Wt 165 lb (74.8 kg)   LMP 07/31/2020   SpO2 100%   B

## 2020-08-13 NOTE — BRIEF OP NOTE
Pre-Operative Diagnosis: irregular menses     Post-Operative Diagnosis: irregular menses      Procedure Performed:   Procedure(s):  hysteroscopy with endometrial polypectomy with myosure    Surgeon(s) and Role:     * Shamar Garrison MD - Primar

## 2020-08-13 NOTE — ANESTHESIA PREPROCEDURE EVALUATION
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

## 2020-08-13 NOTE — ANESTHESIA POSTPROCEDURE EVALUATION
Patient: Araceli Cantor    Procedure Summary     Date:  08/13/20 Room / Location:  [facility] OR  / [facility] OR    Anesthesia Start:  0730 Anesthesia Stop:      Procedure:  MYOMECTOMY HYSTEROSCOPIC (N/A Vagina ) Diagnosis:  (irregular menses)    Surgeon:

## 2020-08-13 NOTE — OPERATIVE REPORT
[facility]    PATIENT'S NAME: CASS Jordan   ATTENDING PHYSICIAN: Eder Medrano MD   OPERATING PHYSICIAN: Eder Medrano MD   PATIENT ACCOUNT#:   [id number]    LOCATION:  [facility] [address] [place] [address]  MEDICAL RECORD #:    condition.     Dictated By Amando Barrios MD  d: 08/13/2020 08:10:04  t: 08/13/2020 08:44:59  Job [id number]  SC/

## 2020-08-27 ENCOUNTER — OFFICE VISIT (OUTPATIENT)
Dept: OBGYN CLINIC | Facility: CLINIC | Age: 45
End: 2020-08-27
Payer: COMMERCIAL

## 2020-08-27 VITALS — DIASTOLIC BLOOD PRESSURE: 70 MMHG | BODY MASS INDEX: 26 KG/M2 | SYSTOLIC BLOOD PRESSURE: 110 MMHG | HEIGHT: 67 IN

## 2020-08-27 DIAGNOSIS — Z48.89 ENCOUNTER FOR POSTOPERATIVE CARE: Primary | ICD-10-CM

## 2020-08-28 ENCOUNTER — TELEPHONE (OUTPATIENT)
Dept: OBGYN CLINIC | Facility: CLINIC | Age: 45
End: 2020-08-28

## 2020-08-28 PROBLEM — Q51.3 BICORNUATE UTERUS: Status: ACTIVE | Noted: 2020-08-28

## 2022-02-08 ENCOUNTER — LAB ENCOUNTER (OUTPATIENT)
Dept: LAB | Facility: REFERENCE LAB | Age: 47
End: 2022-02-08
Attending: FAMILY MEDICINE
Payer: COMMERCIAL

## 2022-02-08 ENCOUNTER — OFFICE VISIT (OUTPATIENT)
Dept: FAMILY MEDICINE CLINIC | Facility: CLINIC | Age: 47
End: 2022-02-08
Payer: COMMERCIAL

## 2022-02-08 VITALS
OXYGEN SATURATION: 99 % | SYSTOLIC BLOOD PRESSURE: 124 MMHG | WEIGHT: 155 LBS | DIASTOLIC BLOOD PRESSURE: 82 MMHG | HEART RATE: 75 BPM | BODY MASS INDEX: 24.33 KG/M2 | HEIGHT: 67 IN

## 2022-02-08 DIAGNOSIS — Z12.11 COLON CANCER SCREENING: ICD-10-CM

## 2022-02-08 DIAGNOSIS — M21.249: Primary | ICD-10-CM

## 2022-02-08 DIAGNOSIS — M21.249: ICD-10-CM

## 2022-02-08 DIAGNOSIS — Z00.00 ROUTINE GENERAL MEDICAL EXAMINATION AT A HEALTH CARE FACILITY: ICD-10-CM

## 2022-02-08 DIAGNOSIS — G25.81 RESTLESS LEG SYNDROME: ICD-10-CM

## 2022-02-08 DIAGNOSIS — L65.9 HAIR LOSS: ICD-10-CM

## 2022-02-08 DIAGNOSIS — Z23 NEED FOR VACCINATION: ICD-10-CM

## 2022-02-08 LAB
ALBUMIN SERPL-MCNC: 4.2 G/DL (ref 3.4–5)
ALBUMIN/GLOB SERPL: 1.5 {RATIO} (ref 1–2)
ALP LIVER SERPL-CCNC: 76 U/L
ALT SERPL-CCNC: 27 U/L
ANION GAP SERPL CALC-SCNC: 6 MMOL/L (ref 0–18)
AST SERPL-CCNC: 24 U/L (ref 15–37)
BASOPHILS # BLD AUTO: 0.03 X10(3) UL (ref 0–0.2)
BASOPHILS NFR BLD AUTO: 0.5 %
BILIRUB SERPL-MCNC: 0.5 MG/DL (ref 0.1–2)
BUN BLD-MCNC: 10 MG/DL (ref 7–18)
BUN/CREAT SERPL: 10.5 (ref 10–20)
CALCIUM BLD-MCNC: 9.6 MG/DL (ref 8.5–10.1)
CHLORIDE SERPL-SCNC: 106 MMOL/L (ref 98–112)
CHOLEST SERPL-MCNC: 233 MG/DL (ref ?–200)
CO2 SERPL-SCNC: 27 MMOL/L (ref 21–32)
CREAT BLD-MCNC: 0.95 MG/DL
DEPRECATED HBV CORE AB SER IA-ACNC: 9.6 NG/ML
DEPRECATED RDW RBC AUTO: 43.8 FL (ref 35.1–46.3)
EOSINOPHIL # BLD AUTO: 0.04 X10(3) UL (ref 0–0.7)
EOSINOPHIL NFR BLD AUTO: 0.7 %
ERYTHROCYTE [DISTWIDTH] IN BLOOD BY AUTOMATED COUNT: 12.1 % (ref 11–15)
EST. AVERAGE GLUCOSE BLD GHB EST-MCNC: 97 MG/DL (ref 68–126)
FASTING PATIENT LIPID ANSWER: YES
FASTING STATUS PATIENT QL REPORTED: YES
GLOBULIN PLAS-MCNC: 2.8 G/DL (ref 2.8–4.4)
GLUCOSE BLD-MCNC: 82 MG/DL (ref 70–99)
HBA1C MFR BLD: 5 % (ref ?–5.7)
HCT VFR BLD AUTO: 45.1 %
HCV AB SERPL QL IA: NONREACTIVE
HDLC SERPL-MCNC: 98 MG/DL (ref 40–59)
HGB BLD-MCNC: 14.8 G/DL
IMM GRANULOCYTES # BLD AUTO: 0.02 X10(3) UL (ref 0–1)
IMM GRANULOCYTES NFR BLD: 0.3 %
IRON SATN MFR SERPL: 33 %
IRON SERPL-MCNC: 155 UG/DL
LDLC SERPL CALC-MCNC: 121 MG/DL (ref ?–100)
LYMPHOCYTES # BLD AUTO: 1.43 X10(3) UL (ref 1–4)
LYMPHOCYTES NFR BLD AUTO: 23.4 %
MCH RBC QN AUTO: 32 PG (ref 26–34)
MCHC RBC AUTO-ENTMCNC: 32.8 G/DL (ref 31–37)
MCV RBC AUTO: 97.4 FL
MONOCYTES # BLD AUTO: 0.47 X10(3) UL (ref 0.1–1)
MONOCYTES NFR BLD AUTO: 7.7 %
NEUTROPHILS # BLD AUTO: 4.13 X10 (3) UL (ref 1.5–7.7)
NEUTROPHILS # BLD AUTO: 4.13 X10(3) UL (ref 1.5–7.7)
NEUTROPHILS NFR BLD AUTO: 67.4 %
NONHDLC SERPL-MCNC: 135 MG/DL (ref ?–130)
OSMOLALITY SERPL CALC.SUM OF ELEC: 286 MOSM/KG (ref 275–295)
PLATELET # BLD AUTO: 229 10(3)UL (ref 150–450)
POTASSIUM SERPL-SCNC: 4.2 MMOL/L (ref 3.5–5.1)
PROT SERPL-MCNC: 7 G/DL (ref 6.4–8.2)
RBC # BLD AUTO: 4.63 X10(6)UL
RHEUMATOID FACT SERPL-ACNC: <10 IU/ML (ref ?–15)
SODIUM SERPL-SCNC: 139 MMOL/L (ref 136–145)
TIBC SERPL-MCNC: 475 UG/DL (ref 240–450)
TRANSFERRIN SERPL-MCNC: 319 MG/DL (ref 200–360)
TRIGL SERPL-MCNC: 80 MG/DL (ref 30–149)
TSI SER-ACNC: 1.35 MIU/ML (ref 0.36–3.74)
VLDLC SERPL CALC-MCNC: 14 MG/DL (ref 0–30)
WBC # BLD AUTO: 6.1 X10(3) UL (ref 4–11)

## 2022-02-08 PROCEDURE — 99204 OFFICE O/P NEW MOD 45 MIN: CPT | Performed by: FAMILY MEDICINE

## 2022-02-08 PROCEDURE — 90686 IIV4 VACC NO PRSV 0.5 ML IM: CPT | Performed by: FAMILY MEDICINE

## 2022-02-08 PROCEDURE — 83540 ASSAY OF IRON: CPT

## 2022-02-08 PROCEDURE — 3008F BODY MASS INDEX DOCD: CPT | Performed by: FAMILY MEDICINE

## 2022-02-08 PROCEDURE — 84466 ASSAY OF TRANSFERRIN: CPT

## 2022-02-08 PROCEDURE — 86431 RHEUMATOID FACTOR QUANT: CPT

## 2022-02-08 PROCEDURE — 80061 LIPID PANEL: CPT

## 2022-02-08 PROCEDURE — 84443 ASSAY THYROID STIM HORMONE: CPT

## 2022-02-08 PROCEDURE — 3074F SYST BP LT 130 MM HG: CPT | Performed by: FAMILY MEDICINE

## 2022-02-08 PROCEDURE — 82728 ASSAY OF FERRITIN: CPT

## 2022-02-08 PROCEDURE — 85025 COMPLETE CBC W/AUTO DIFF WBC: CPT

## 2022-02-08 PROCEDURE — 86803 HEPATITIS C AB TEST: CPT

## 2022-02-08 PROCEDURE — 83036 HEMOGLOBIN GLYCOSYLATED A1C: CPT

## 2022-02-08 PROCEDURE — 3079F DIAST BP 80-89 MM HG: CPT | Performed by: FAMILY MEDICINE

## 2022-02-08 PROCEDURE — 90471 IMMUNIZATION ADMIN: CPT | Performed by: FAMILY MEDICINE

## 2022-02-08 PROCEDURE — 86200 CCP ANTIBODY: CPT

## 2022-02-08 PROCEDURE — 36415 COLL VENOUS BLD VENIPUNCTURE: CPT

## 2022-02-08 PROCEDURE — 80053 COMPREHEN METABOLIC PANEL: CPT

## 2022-02-11 LAB — CCP IGG SERPL-ACNC: 2.2 U/ML (ref 0–6.9)

## 2022-03-22 ENCOUNTER — TELEPHONE (OUTPATIENT)
Dept: GASTROENTEROLOGY | Facility: CLINIC | Age: 47
End: 2022-03-22

## 2022-03-22 NOTE — TELEPHONE ENCOUNTER
Entered into Epic. Recall colon/egd in 5 years per Lauren Gaviria, aprn. Last bi-directional performed with Dr. Olivier Ghosh at [facility] 02/01/2018. 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 Recall entered into Patient Outreach for 02/01/2023. Health Maintenance updated.

## 2022-07-19 ENCOUNTER — OFFICE VISIT (OUTPATIENT)
Dept: OBGYN CLINIC | Facility: CLINIC | Age: 47
End: 2022-07-19
Payer: COMMERCIAL

## 2022-07-19 VITALS
HEIGHT: 67 IN | DIASTOLIC BLOOD PRESSURE: 76 MMHG | SYSTOLIC BLOOD PRESSURE: 112 MMHG | BODY MASS INDEX: 24.33 KG/M2 | WEIGHT: 155 LBS

## 2022-07-19 DIAGNOSIS — Z01.419 ENCOUNTER FOR GYNECOLOGICAL EXAMINATION WITHOUT ABNORMAL FINDING: Primary | ICD-10-CM

## 2022-07-19 DIAGNOSIS — N92.6 IRREGULAR MENSES: ICD-10-CM

## 2022-07-19 PROCEDURE — 3078F DIAST BP <80 MM HG: CPT | Performed by: OBSTETRICS & GYNECOLOGY

## 2022-07-19 PROCEDURE — 3074F SYST BP LT 130 MM HG: CPT | Performed by: OBSTETRICS & GYNECOLOGY

## 2022-07-19 PROCEDURE — 99396 PREV VISIT EST AGE 40-64: CPT | Performed by: OBSTETRICS & GYNECOLOGY

## 2022-07-19 PROCEDURE — 87624 HPV HI-RISK TYP POOLED RSLT: CPT | Performed by: OBSTETRICS & GYNECOLOGY

## 2022-07-19 PROCEDURE — 3008F BODY MASS INDEX DOCD: CPT | Performed by: OBSTETRICS & GYNECOLOGY

## 2022-07-19 RX ORDER — CYCLOBENZAPRINE HCL 5 MG
TABLET ORAL
COMMUNITY
Start: 2022-06-27

## 2022-07-19 RX ORDER — METHYLPHENIDATE HYDROCHLORIDE 20 MG/1
20 TABLET ORAL DAILY PRN
COMMUNITY
Start: 2022-06-28

## 2022-07-19 RX ORDER — DROSPIRENONE AND ETHINYL ESTRADIOL 0.02-3(28)
1 KIT ORAL DAILY
Qty: 84 TABLET | Refills: 3 | Status: SHIPPED | OUTPATIENT
Start: 2022-07-19 | End: 2023-07-19

## 2022-07-19 RX ORDER — KETOCONAZOLE 20 MG/ML
SHAMPOO TOPICAL
COMMUNITY
Start: 2022-05-03

## 2022-07-20 LAB — HPV I/H RISK 1 DNA SPEC QL NAA+PROBE: NEGATIVE

## 2022-11-25 ENCOUNTER — TELEPHONE (OUTPATIENT)
Facility: CLINIC | Age: 47
End: 2022-11-25

## 2022-11-25 NOTE — TELEPHONE ENCOUNTER
Patient outreach message received:    Entered into Epic. Recall colon/egd in 5 years per maryuri Glasgow. Last bi-directional performed with Dr. Kandice Zuñiga at [facility] 02/01/2018. 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 Recall entered into Patient Outreach for 02/01/2023. Recall reminder letter mailed out to patient.

## 2023-02-24 ENCOUNTER — TELEPHONE (OUTPATIENT)
Facility: CLINIC | Age: 48
End: 2023-02-24

## 2023-02-24 RX ORDER — BUPROPION HYDROCHLORIDE 150 MG/1
150 TABLET, EXTENDED RELEASE ORAL DAILY
COMMUNITY
End: 2023-04-19

## 2023-02-24 RX ORDER — MELATONIN
325
COMMUNITY

## 2023-02-24 NOTE — TELEPHONE ENCOUNTER
Last Procedure, Date, MD:    Last Diagnosis:    Recalled for (mth/yrs):   Sedation used previously:    Last Prep Used (if known):    Quality of prep (if known): Anticoagulants:N  Diabetic Meds: N  BP meds(Ace inhibitors/ARB's):N  Weight loss meds (phentermine/vyvanse):N  Iron supplement (RX/OTC):Y  Height & Weight/BMI:5'7\"/165lb/25.8  Hx of Cardiac/CVA issues/(MI/Stroke): N  Devices Pacemaker/Defibrillator/Stents: N  Resp. Issues/Oxygen Use/MELITA/COPD:N  Issues w/Anesthesia:N    Symptoms (Y/N): N   Special comments/notes: Allergy, pharmacy and medications reviewed with pt over the phone. Unable to complete information, waiting for pt/s permission for share everywhere via [employer] to obtain outside record.

## 2023-02-24 NOTE — TELEPHONE ENCOUNTER
Pt is calling to schedule for her recall CLN/EGD     Recall letter sent on 11/25/2022, last visit with Alea Arguelles was 3/22/2022

## 2023-02-26 ENCOUNTER — HOSPITAL ENCOUNTER (OUTPATIENT)
Age: 48
Discharge: HOME | End: 2023-02-26
Payer: COMMERCIAL

## 2023-02-26 VITALS
DIASTOLIC BLOOD PRESSURE: 74 MMHG | RESPIRATION RATE: 18 BRPM | TEMPERATURE: 98 F | SYSTOLIC BLOOD PRESSURE: 108 MMHG | OXYGEN SATURATION: 100 % | HEART RATE: 88 BPM

## 2023-02-26 DIAGNOSIS — U07.1 COVID-19 VIRUS INFECTION: Primary | ICD-10-CM

## 2023-02-26 DIAGNOSIS — R07.0 THROAT PAIN IN ADULT: ICD-10-CM

## 2023-02-26 LAB
S PYO AG THROAT QL: NEGATIVE
SARS-COV-2 RNA RESP QL NAA+PROBE: DETECTED

## 2023-02-26 PROCEDURE — U0002 COVID-19 LAB TEST NON-CDC: HCPCS | Performed by: NURSE PRACTITIONER

## 2023-02-26 PROCEDURE — 99203 OFFICE O/P NEW LOW 30 MIN: CPT | Performed by: NURSE PRACTITIONER

## 2023-02-26 PROCEDURE — 87880 STREP A ASSAY W/OPTIC: CPT | Performed by: NURSE PRACTITIONER

## 2023-02-26 RX ORDER — LIDOCAINE HYDROCHLORIDE 20 MG/ML
5 SOLUTION OROPHARYNGEAL
Qty: 100 ML | Refills: 0 | Status: SHIPPED | OUTPATIENT
Start: 2023-02-26

## 2023-03-23 NOTE — TELEPHONE ENCOUNTER
Left message to call back, re: need to collect pt authorization to access record (gastro) from [facility]. CSS:  Please transfer to RN if patient calls back. Thank you.

## 2023-03-24 NOTE — TELEPHONE ENCOUNTER
[facility] [phone], attendant said this MD does not work there anymore, unable to obtain cln record from medical records. Called pt, instructed to [facility] [place], pt said, ok I will do that.

## 2023-03-24 NOTE — TELEPHONE ENCOUNTER
Called patient, name/ verified. Erika gave phone consent for me to access records from [facility], she said she had colonoscopy in [facility].

## 2023-04-19 ENCOUNTER — OFFICE VISIT (OUTPATIENT)
Facility: CLINIC | Age: 48
End: 2023-04-19
Payer: COMMERCIAL

## 2023-04-19 ENCOUNTER — LAB ENCOUNTER (OUTPATIENT)
Dept: LAB | Facility: REFERENCE LAB | Age: 48
End: 2023-04-19
Attending: FAMILY MEDICINE
Payer: COMMERCIAL

## 2023-04-19 ENCOUNTER — TELEPHONE (OUTPATIENT)
Dept: GASTROENTEROLOGY | Facility: CLINIC | Age: 48
End: 2023-04-19

## 2023-04-19 VITALS
OXYGEN SATURATION: 98 % | BODY MASS INDEX: 26.68 KG/M2 | HEART RATE: 82 BPM | HEIGHT: 67 IN | SYSTOLIC BLOOD PRESSURE: 122 MMHG | DIASTOLIC BLOOD PRESSURE: 78 MMHG | WEIGHT: 170 LBS

## 2023-04-19 DIAGNOSIS — Z00.00 ENCOUNTER FOR ROUTINE ADULT HEALTH EXAMINATION WITHOUT ABNORMAL FINDINGS: Primary | ICD-10-CM

## 2023-04-19 DIAGNOSIS — E61.1 IRON DEFICIENCY: ICD-10-CM

## 2023-04-19 DIAGNOSIS — M81.8 OTHER OSTEOPOROSIS WITHOUT CURRENT PATHOLOGICAL FRACTURE: ICD-10-CM

## 2023-04-19 DIAGNOSIS — Z23 NEED FOR VACCINATION: ICD-10-CM

## 2023-04-19 DIAGNOSIS — Z00.00 ENCOUNTER FOR ROUTINE ADULT HEALTH EXAMINATION WITHOUT ABNORMAL FINDINGS: ICD-10-CM

## 2023-04-19 DIAGNOSIS — R63.5 WEIGHT GAIN: ICD-10-CM

## 2023-04-19 DIAGNOSIS — Z12.11 COLON CANCER SCREENING: ICD-10-CM

## 2023-04-19 DIAGNOSIS — F33.0 MILD EPISODE OF RECURRENT MAJOR DEPRESSIVE DISORDER (HCC): ICD-10-CM

## 2023-04-19 DIAGNOSIS — E66.3 OVERWEIGHT (BMI 25.0-29.9): ICD-10-CM

## 2023-04-19 LAB
ALBUMIN SERPL-MCNC: 3.7 G/DL (ref 3.4–5)
ALBUMIN/GLOB SERPL: 1.1 {RATIO} (ref 1–2)
ALP LIVER SERPL-CCNC: 60 U/L
ALT SERPL-CCNC: 26 U/L
ANION GAP SERPL CALC-SCNC: 7 MMOL/L (ref 0–18)
AST SERPL-CCNC: 25 U/L (ref 15–37)
BASOPHILS # BLD AUTO: 0.07 X10(3) UL (ref 0–0.2)
BASOPHILS NFR BLD AUTO: 0.7 %
BILIRUB SERPL-MCNC: 0.5 MG/DL (ref 0.1–2)
BUN BLD-MCNC: 9 MG/DL (ref 7–18)
BUN/CREAT SERPL: 8.1 (ref 10–20)
CALCIUM BLD-MCNC: 9.6 MG/DL (ref 8.5–10.1)
CHLORIDE SERPL-SCNC: 110 MMOL/L (ref 98–112)
CHOLEST SERPL-MCNC: 237 MG/DL (ref ?–200)
CO2 SERPL-SCNC: 26 MMOL/L (ref 21–32)
CREAT BLD-MCNC: 1.11 MG/DL
DEPRECATED HBV CORE AB SER IA-ACNC: 73.8 NG/ML
DEPRECATED RDW RBC AUTO: 41.6 FL (ref 35.1–46.3)
EOSINOPHIL # BLD AUTO: 0.11 X10(3) UL (ref 0–0.7)
EOSINOPHIL NFR BLD AUTO: 1.2 %
ERYTHROCYTE [DISTWIDTH] IN BLOOD BY AUTOMATED COUNT: 11.9 % (ref 11–15)
EST. AVERAGE GLUCOSE BLD GHB EST-MCNC: 97 MG/DL (ref 68–126)
FASTING PATIENT LIPID ANSWER: YES
FASTING STATUS PATIENT QL REPORTED: YES
GFR SERPLBLD BASED ON 1.73 SQ M-ARVRAT: 62 ML/MIN/1.73M2 (ref 60–?)
GLOBULIN PLAS-MCNC: 3.5 G/DL (ref 2.8–4.4)
GLUCOSE BLD-MCNC: 90 MG/DL (ref 70–99)
HBA1C MFR BLD: 5 % (ref ?–5.7)
HCT VFR BLD AUTO: 43.7 %
HDLC SERPL-MCNC: 92 MG/DL (ref 40–59)
HGB BLD-MCNC: 14.7 G/DL
IMM GRANULOCYTES # BLD AUTO: 0.02 X10(3) UL (ref 0–1)
IMM GRANULOCYTES NFR BLD: 0.2 %
INSULIN SERPL-ACNC: 8.6 MU/L (ref 3–25)
IRON SATN MFR SERPL: 50 %
IRON SERPL-MCNC: 226 UG/DL
LDLC SERPL CALC-MCNC: 124 MG/DL (ref ?–100)
LYMPHOCYTES # BLD AUTO: 1.75 X10(3) UL (ref 1–4)
LYMPHOCYTES NFR BLD AUTO: 18.7 %
MCH RBC QN AUTO: 32 PG (ref 26–34)
MCHC RBC AUTO-ENTMCNC: 33.6 G/DL (ref 31–37)
MCV RBC AUTO: 95 FL
MONOCYTES # BLD AUTO: 0.51 X10(3) UL (ref 0.1–1)
MONOCYTES NFR BLD AUTO: 5.4 %
NEUTROPHILS # BLD AUTO: 6.92 X10 (3) UL (ref 1.5–7.7)
NEUTROPHILS # BLD AUTO: 6.92 X10(3) UL (ref 1.5–7.7)
NEUTROPHILS NFR BLD AUTO: 73.8 %
NONHDLC SERPL-MCNC: 145 MG/DL (ref ?–130)
OSMOLALITY SERPL CALC.SUM OF ELEC: 294 MOSM/KG (ref 275–295)
PLATELET # BLD AUTO: 277 10(3)UL (ref 150–450)
POTASSIUM SERPL-SCNC: 4.6 MMOL/L (ref 3.5–5.1)
PROT SERPL-MCNC: 7.2 G/DL (ref 6.4–8.2)
RBC # BLD AUTO: 4.6 X10(6)UL
SODIUM SERPL-SCNC: 143 MMOL/L (ref 136–145)
TIBC SERPL-MCNC: 456 UG/DL (ref 240–450)
TRANSFERRIN SERPL-MCNC: 306 MG/DL (ref 200–360)
TRIGL SERPL-MCNC: 124 MG/DL (ref 30–149)
VLDLC SERPL CALC-MCNC: 22 MG/DL (ref 0–30)
WBC # BLD AUTO: 9.4 X10(3) UL (ref 4–11)

## 2023-04-19 PROCEDURE — 80061 LIPID PANEL: CPT

## 2023-04-19 PROCEDURE — 90715 TDAP VACCINE 7 YRS/> IM: CPT | Performed by: FAMILY MEDICINE

## 2023-04-19 PROCEDURE — 99214 OFFICE O/P EST MOD 30 MIN: CPT | Performed by: FAMILY MEDICINE

## 2023-04-19 PROCEDURE — 84466 ASSAY OF TRANSFERRIN: CPT

## 2023-04-19 PROCEDURE — 90471 IMMUNIZATION ADMIN: CPT | Performed by: FAMILY MEDICINE

## 2023-04-19 PROCEDURE — 82728 ASSAY OF FERRITIN: CPT

## 2023-04-19 PROCEDURE — 3008F BODY MASS INDEX DOCD: CPT | Performed by: FAMILY MEDICINE

## 2023-04-19 PROCEDURE — 80053 COMPREHEN METABOLIC PANEL: CPT

## 2023-04-19 PROCEDURE — 83036 HEMOGLOBIN GLYCOSYLATED A1C: CPT

## 2023-04-19 PROCEDURE — 3074F SYST BP LT 130 MM HG: CPT | Performed by: FAMILY MEDICINE

## 2023-04-19 PROCEDURE — 83540 ASSAY OF IRON: CPT

## 2023-04-19 PROCEDURE — 3078F DIAST BP <80 MM HG: CPT | Performed by: FAMILY MEDICINE

## 2023-04-19 PROCEDURE — 83525 ASSAY OF INSULIN: CPT

## 2023-04-19 PROCEDURE — 99396 PREV VISIT EST AGE 40-64: CPT | Performed by: FAMILY MEDICINE

## 2023-04-19 PROCEDURE — 85025 COMPLETE CBC W/AUTO DIFF WBC: CPT

## 2023-04-19 PROCEDURE — 36415 COLL VENOUS BLD VENIPUNCTURE: CPT

## 2023-04-19 RX ORDER — BUPROPION HYDROCHLORIDE 150 MG/1
150 TABLET ORAL EVERY MORNING
COMMUNITY
Start: 2023-03-14

## 2023-04-19 RX ORDER — POLYETHYLENE GLYCOL 3350, SODIUM CHLORIDE, SODIUM BICARBONATE, POTASSIUM CHLORIDE 420; 11.2; 5.72; 1.48 G/4L; G/4L; G/4L; G/4L
POWDER, FOR SOLUTION ORAL
Qty: 4000 ML | Refills: 0 | Status: SHIPPED | OUTPATIENT
Start: 2023-04-19

## 2023-04-20 ENCOUNTER — PATIENT MESSAGE (OUTPATIENT)
Facility: CLINIC | Age: 48
End: 2023-04-20

## 2023-04-21 ENCOUNTER — TELEPHONE (OUTPATIENT)
Facility: CLINIC | Age: 48
End: 2023-04-21

## 2023-04-21 NOTE — TELEPHONE ENCOUNTER
TRIAGE SUPPORT=please assist ,thanks. Vivi Lao, RN 4/20/2023  8:46 PM CDT        ----- Message -----  From: Jalyn Monsivais  Sent: 4/20/2023  11:11 AM CDT  To: Em Triage Support  Subject: Prior Authorization for Vyvanse                  HI Dr. Lacie Curry. ..this is from my insurance provider: The issue is that Vyvanse requires a prior authorization so your prescribing doctor needs to either call ([phone]) for the prior authorization or can file online at [email or web address]. Dmitri called last night and said they were having insurance issues. Would you be able to call them? Thank you so much for your help!!   Ramya Valadez

## 2023-04-28 NOTE — TELEPHONE ENCOUNTER
Called insurance to check status on [place]    [phone] [facility]  Hold time 40 min.   Call back monday

## 2023-05-01 NOTE — TELEPHONE ENCOUNTER
Denial was not faxed to triage, on cite staff please look out for denial letter.      Generally you may write a letter of Exemption that we can fax to the payor to appeal

## 2023-05-02 ENCOUNTER — PATIENT MESSAGE (OUTPATIENT)
Facility: CLINIC | Age: 48
End: 2023-05-02

## 2023-05-03 NOTE — TELEPHONE ENCOUNTER
I did not receive a denial letter from her insurance. Please follow-up with patient to see if she found anything else out about coverage.

## 2023-05-04 NOTE — TELEPHONE ENCOUNTER
From: Nico Curry  To: Eliverto Bloch, DO  Sent: 5/2/2023 7:29 AM CDT  Subject: Vyvanse    MELL Lujan is saying that the medication isn't covered b/c I don't fit any of the criteria. I do - I do have ADHD and a previous eating disorder. Are you able to send back the request for medication with this info? ? Thank you so much and please let me know if need anything from me.   Catrachita Mays

## 2023-05-05 PROBLEM — F98.8 ATTENTION DEFICIT DISORDER (ADD) WITHOUT HYPERACTIVITY: Status: ACTIVE | Noted: 2023-05-05

## 2023-05-05 NOTE — TELEPHONE ENCOUNTER
There is nothing to fill out in what she attached but just a number to call. Can you please call the number and let them know she has ADHD?

## 2023-05-05 NOTE — TELEPHONE ENCOUNTER
GI Schedulers,    Please assist pt with scheduling procedure below. Please take note pt is taking Vyvanse for ADHD. Thank you.

## 2023-05-05 NOTE — TELEPHONE ENCOUNTER
Appeal states a letter can be sent to fax provided for exemption.  A phone call will need to be peer-peer     Letter should include diagnosis as it is not on her problem list or last two visits with PCP    Letter pended in communication tab, please add diagnosis code as this is needed sign and route back to triage support

## 2023-05-16 ENCOUNTER — HOSPITAL ENCOUNTER (OUTPATIENT)
Dept: BONE DENSITY | Age: 48
Discharge: HOME | End: 2023-05-16
Attending: FAMILY MEDICINE
Payer: COMMERCIAL

## 2023-05-16 DIAGNOSIS — M81.8 OTHER OSTEOPOROSIS WITHOUT CURRENT PATHOLOGICAL FRACTURE: ICD-10-CM

## 2023-05-16 PROCEDURE — 77080 DXA BONE DENSITY AXIAL: CPT | Performed by: FAMILY MEDICINE

## 2023-05-18 ENCOUNTER — PATIENT MESSAGE (OUTPATIENT)
Facility: CLINIC | Age: 48
End: 2023-05-18

## 2023-05-19 NOTE — TELEPHONE ENCOUNTER
Was the appeal submitted? Please advise and help with how to proceed here. She has ADHD and binge eating disorder.

## 2023-05-22 NOTE — TELEPHONE ENCOUNTER
Called JOSELYN and was on hold for 15 minutes. Hung up. Faxed appeal letter to [facility] again at [phone]. Notified patient to follow up with her insurance.

## 2023-05-24 PROBLEM — F50.819 BINGE EATING DISORDER: Status: ACTIVE | Noted: 2023-05-24

## 2023-05-24 PROBLEM — F50.81 BINGE EATING DISORDER: Status: ACTIVE | Noted: 2023-05-24

## 2023-06-10 NOTE — TELEPHONE ENCOUNTER
This is the third attempt to reach this pt in regards to scheduling with NO success scheduling Colon/EGd procedure. LMTCB    Letter sent. TE closed.

## 2023-06-30 NOTE — TELEPHONE ENCOUNTER
Patient called and August 10th would work well for her. Surgery rescheduled for 8/20/20 and presurgical appointment scheduled for 7/23/20. Specialty Care (Immediate)...

## 2023-07-04 DIAGNOSIS — N92.6 IRREGULAR MENSES: ICD-10-CM

## 2023-07-05 ENCOUNTER — PATIENT MESSAGE (OUTPATIENT)
Dept: OBGYN CLINIC | Facility: CLINIC | Age: 48
End: 2023-07-05

## 2023-07-05 DIAGNOSIS — N92.6 IRREGULAR MENSES: ICD-10-CM

## 2023-07-05 RX ORDER — DROSPIRENONE AND ETHINYL ESTRADIOL 0.02-3(28)
1 KIT ORAL DAILY
Qty: 84 TABLET | Refills: 3 | OUTPATIENT
Start: 2023-07-05 | End: 2024-07-04

## 2023-07-05 RX ORDER — DROSPIRENONE AND ETHINYL ESTRADIOL 0.02-3(28)
1 KIT ORAL DAILY
Qty: 84 TABLET | Refills: 0 | Status: SHIPPED | OUTPATIENT
Start: 2023-07-05 | End: 2023-09-27

## 2023-07-05 RX ORDER — PHENTERMINE HYDROCHLORIDE 15 MG/1
15 CAPSULE ORAL EVERY MORNING
Qty: 30 CAPSULE | Refills: 1 | Status: SHIPPED | OUTPATIENT
Start: 2023-07-05

## 2023-07-05 NOTE — TELEPHONE ENCOUNTER
Failed Protocol/No Protocol. Requested Prescriptions   Pending Prescriptions Disp Refills    Phentermine HCl 15 MG Oral Cap 30 capsule 1     Sig: Take 1 capsule (15 mg total) by mouth every morning.        There is no refill protocol information for this order          Future Appointments         Provider Department Appt Notes    In 1 week Getachew Garrison MD Vernita Ellis, [address] Refill birth control subscription          Recent Outpatient Visits              2 months ago Encounter for routine adult health examination without abnormal findings    [facility] [address], [place]    Office Visit    11 months ago Encounter for gynecological examination without abnormal finding    [address], [address], [address] MD    Office Visit    1 year ago Flexion deformity of finger joint, unspecified laterality    [facility], [place] [address], [place]    Office Visit    2 years ago Encounter for postoperative care    [address], Jerri Councilman, MD    Office Visit    2 years ago Menorrhagia with regular cycle    [address], [address], Jodi Moreno MD    Office Visit

## 2023-07-05 NOTE — TELEPHONE ENCOUNTER
From: Tomasa Hi  To: Emily Ayala MD  Sent: 7/5/2023 10:50 AM CDT  Subject: Refill    HI     I have an appt next week but my birth control has run out. Are you able to give me a refill for just a month and then after I am in, you can do the entire year?   Thank you, Mikhail Patel

## 2023-07-13 ENCOUNTER — OFFICE VISIT (OUTPATIENT)
Dept: OBGYN CLINIC | Facility: CLINIC | Age: 48
End: 2023-07-13
Payer: COMMERCIAL

## 2023-07-13 VITALS
SYSTOLIC BLOOD PRESSURE: 100 MMHG | HEIGHT: 67 IN | DIASTOLIC BLOOD PRESSURE: 72 MMHG | WEIGHT: 165 LBS | BODY MASS INDEX: 25.9 KG/M2

## 2023-07-13 DIAGNOSIS — Z30.41 ENCOUNTER FOR SURVEILLANCE OF CONTRACEPTIVE PILLS: Primary | ICD-10-CM

## 2023-07-13 DIAGNOSIS — N92.6 IRREGULAR MENSES: ICD-10-CM

## 2023-07-13 RX ORDER — BUPROPION HYDROCHLORIDE 300 MG/1
300 TABLET ORAL DAILY
COMMUNITY
Start: 2023-04-21

## 2023-07-13 RX ORDER — DROSPIRENONE AND ETHINYL ESTRADIOL 0.02-3(28)
1 KIT ORAL DAILY
Qty: 84 TABLET | Refills: 3 | Status: SHIPPED | OUTPATIENT
Start: 2023-07-13 | End: 2024-07-12

## 2023-08-12 ENCOUNTER — HOSPITAL ENCOUNTER (OUTPATIENT)
Age: 48
Discharge: HOME | End: 2023-08-12
Payer: COMMERCIAL

## 2023-08-12 VITALS
RESPIRATION RATE: 16 BRPM | DIASTOLIC BLOOD PRESSURE: 80 MMHG | OXYGEN SATURATION: 100 % | HEART RATE: 82 BPM | SYSTOLIC BLOOD PRESSURE: 119 MMHG | TEMPERATURE: 98 F

## 2023-08-12 DIAGNOSIS — J06.9 VIRAL URI: Primary | ICD-10-CM

## 2023-08-12 DIAGNOSIS — J02.9 VIRAL PHARYNGITIS: ICD-10-CM

## 2023-08-12 LAB — S PYO AG THROAT QL: NEGATIVE

## 2023-08-12 NOTE — ED INITIAL ASSESSMENT (HPI)
Pt with congestion and runny nose since Wednesday, then burning sore throat since yesterday and left ear pressure; denies fever; negative home covid test

## 2023-08-18 ENCOUNTER — PATIENT MESSAGE (OUTPATIENT)
Facility: CLINIC | Age: 48
End: 2023-08-18

## 2023-08-18 NOTE — TELEPHONE ENCOUNTER
From: Francine Mack  To: Pilar Lara DO  Sent: 8/18/2023 11:29 AM CDT  Subject: Refill until my appt    Hi - I have scheduled an appt back in July and can't get into see you until end of September. My med is going to run out in the next two weeks. Could you send a refill for just one more month until I get in to see you? Thank you - much appreciated.  Jaylyn Wylie

## 2023-09-19 ENCOUNTER — OFFICE VISIT (OUTPATIENT)
Facility: CLINIC | Age: 48
End: 2023-09-19
Payer: COMMERCIAL

## 2023-09-19 VITALS
DIASTOLIC BLOOD PRESSURE: 72 MMHG | WEIGHT: 160 LBS | HEIGHT: 67 IN | HEART RATE: 86 BPM | OXYGEN SATURATION: 99 % | SYSTOLIC BLOOD PRESSURE: 118 MMHG | BODY MASS INDEX: 25.11 KG/M2

## 2023-09-19 DIAGNOSIS — E66.3 OVERWEIGHT (BMI 25.0-29.9): Primary | ICD-10-CM

## 2023-09-19 DIAGNOSIS — F98.8 ATTENTION DEFICIT DISORDER (ADD) WITHOUT HYPERACTIVITY: ICD-10-CM

## 2023-09-19 PROCEDURE — 3078F DIAST BP <80 MM HG: CPT | Performed by: FAMILY MEDICINE

## 2023-09-19 PROCEDURE — 3074F SYST BP LT 130 MM HG: CPT | Performed by: FAMILY MEDICINE

## 2023-09-19 PROCEDURE — 99213 OFFICE O/P EST LOW 20 MIN: CPT | Performed by: FAMILY MEDICINE

## 2023-09-19 PROCEDURE — 3008F BODY MASS INDEX DOCD: CPT | Performed by: FAMILY MEDICINE

## 2023-09-19 RX ORDER — PHENTERMINE HYDROCHLORIDE 30 MG/1
30 CAPSULE ORAL EVERY MORNING
Qty: 30 CAPSULE | Refills: 2 | Status: SHIPPED | OUTPATIENT
Start: 2023-09-19

## 2023-10-24 ENCOUNTER — APPOINTMENT (OUTPATIENT)
Dept: GENERAL RADIOLOGY | Facility: HOSPITAL | Age: 48
End: 2023-10-24
Attending: EMERGENCY MEDICINE
Payer: COMMERCIAL

## 2023-10-24 ENCOUNTER — HOSPITAL ENCOUNTER (OUTPATIENT)
Age: 48
Discharge: ANOTHER HOSPITAL | End: 2023-10-24

## 2023-10-24 ENCOUNTER — HOSPITAL ENCOUNTER (EMERGENCY)
Facility: HOSPITAL | Age: 48
Discharge: HOME | End: 2023-10-24
Attending: EMERGENCY MEDICINE
Payer: COMMERCIAL

## 2023-10-24 ENCOUNTER — APPOINTMENT (OUTPATIENT)
Dept: GENERAL RADIOLOGY | Age: 48
End: 2023-10-24
Attending: NURSE PRACTITIONER

## 2023-10-24 VITALS
HEART RATE: 79 BPM | DIASTOLIC BLOOD PRESSURE: 87 MMHG | TEMPERATURE: 98 F | RESPIRATION RATE: 16 BRPM | OXYGEN SATURATION: 99 % | SYSTOLIC BLOOD PRESSURE: 131 MMHG

## 2023-10-24 VITALS
DIASTOLIC BLOOD PRESSURE: 82 MMHG | HEART RATE: 101 BPM | TEMPERATURE: 99 F | SYSTOLIC BLOOD PRESSURE: 126 MMHG | RESPIRATION RATE: 20 BRPM | OXYGEN SATURATION: 100 %

## 2023-10-24 DIAGNOSIS — B34.9 VIRAL SYNDROME: Primary | ICD-10-CM

## 2023-10-24 DIAGNOSIS — R94.31 ABNORMAL EKG: ICD-10-CM

## 2023-10-24 DIAGNOSIS — R07.9 CHEST PAIN OF UNCERTAIN ETIOLOGY: Primary | ICD-10-CM

## 2023-10-24 DIAGNOSIS — R07.9 CHEST PAIN OF UNCERTAIN ETIOLOGY: ICD-10-CM

## 2023-10-24 LAB
#MXD IC: 0.5 X10ˆ3/UL (ref 0.1–1)
ANION GAP SERPL CALC-SCNC: 5 MMOL/L (ref 0–18)
BASOPHILS # BLD AUTO: 0.04 X10(3) UL (ref 0–0.2)
BASOPHILS NFR BLD AUTO: 0.5 %
BUN BLD-MCNC: 13 MG/DL (ref 7–18)
BUN BLD-MCNC: 14 MG/DL (ref 7–18)
BUN/CREAT SERPL: 13 (ref 10–20)
CALCIUM BLD-MCNC: 8.9 MG/DL (ref 8.5–10.1)
CHLORIDE BLD-SCNC: 105 MMOL/L (ref 98–112)
CHLORIDE SERPL-SCNC: 110 MMOL/L (ref 98–112)
CO2 BLD-SCNC: 23 MMOL/L (ref 21–32)
CO2 SERPL-SCNC: 27 MMOL/L (ref 21–32)
CREAT BLD-MCNC: 0.9 MG/DL
CREAT BLD-MCNC: 1 MG/DL
D DIMER PPP FEU-MCNC: <0.27 UG/ML FEU (ref ?–0.5)
DDIMER WHOLE BLOOD: <200 NG/ML DDU (ref ?–400)
DEPRECATED RDW RBC AUTO: 40 FL (ref 35.1–46.3)
EGFRCR SERPLBLD CKD-EPI 2021: 70 ML/MIN/1.73M2 (ref 60–?)
EGFRCR SERPLBLD CKD-EPI 2021: 79 ML/MIN/1.73M2 (ref 60–?)
EOSINOPHIL # BLD AUTO: 0.15 X10(3) UL (ref 0–0.7)
EOSINOPHIL NFR BLD AUTO: 1.8 %
ERYTHROCYTE [DISTWIDTH] IN BLOOD BY AUTOMATED COUNT: 11.6 % (ref 11–15)
GLUCOSE BLD-MCNC: 109 MG/DL (ref 70–99)
GLUCOSE BLD-MCNC: 123 MG/DL (ref 70–99)
HCT VFR BLD AUTO: 41.4 %
HCT VFR BLD AUTO: 42.3 %
HCT VFR BLD CALC: 44 %
HGB BLD-MCNC: 14.1 G/DL
HGB BLD-MCNC: 14.4 G/DL
IMM GRANULOCYTES # BLD AUTO: 0.02 X10(3) UL (ref 0–1)
IMM GRANULOCYTES NFR BLD: 0.2 %
ISTAT IONIZED CALCIUM FOR CHEM 8: 1.2 MMOL/L (ref 1.12–1.32)
LYMPHOCYTES # BLD AUTO: 2.2 X10ˆ3/UL (ref 1–4)
LYMPHOCYTES # BLD AUTO: 2.29 X10(3) UL (ref 1–4)
LYMPHOCYTES NFR BLD AUTO: 25.5 %
LYMPHOCYTES NFR BLD AUTO: 28.2 %
MCH RBC QN AUTO: 31.6 PG (ref 26–34)
MCH RBC QN AUTO: 32.1 PG (ref 26–34)
MCHC RBC AUTO-ENTMCNC: 34 G/DL (ref 31–37)
MCHC RBC AUTO-ENTMCNC: 34.1 G/DL (ref 31–37)
MCV RBC AUTO: 92.8 FL (ref 80–100)
MCV RBC AUTO: 94.4 FL
MIXED CELL %: 5.3 %
MONOCYTES # BLD AUTO: 0.52 X10(3) UL (ref 0.1–1)
MONOCYTES NFR BLD AUTO: 6.4 %
NEUTROPHILS # BLD AUTO: 5.09 X10 (3) UL (ref 1.5–7.7)
NEUTROPHILS # BLD AUTO: 5.09 X10(3) UL (ref 1.5–7.7)
NEUTROPHILS # BLD AUTO: 6 X10ˆ3/UL (ref 1.5–7.7)
NEUTROPHILS NFR BLD AUTO: 62.9 %
NEUTROPHILS NFR BLD AUTO: 69.2 %
OSMOLALITY SERPL CALC.SUM OF ELEC: 295 MOSM/KG (ref 275–295)
PLATELET # BLD AUTO: 289 X10ˆ3/UL (ref 150–450)
PLATELET # BLD AUTO: 299 10(3)UL (ref 150–450)
POTASSIUM BLD-SCNC: 4.1 MMOL/L (ref 3.6–5.1)
POTASSIUM SERPL-SCNC: 4.1 MMOL/L (ref 3.5–5.1)
RBC # BLD AUTO: 4.46 X10ˆ6/UL
RBC # BLD AUTO: 4.48 X10(6)UL
SARS-COV-2 RNA RESP QL NAA+PROBE: NOT DETECTED
SODIUM BLD-SCNC: 139 MMOL/L (ref 136–145)
SODIUM SERPL-SCNC: 142 MMOL/L (ref 136–145)
TROPONIN I BLD-MCNC: <0.02 NG/ML
TROPONIN I HIGH SENSITIVITY: 5 NG/L
TSI SER-ACNC: 2.13 MIU/ML (ref 0.36–3.74)
WBC # BLD AUTO: 8.1 X10(3) UL (ref 4–11)
WBC # BLD AUTO: 8.7 X10ˆ3/UL (ref 4–11)

## 2023-10-24 PROCEDURE — 85379 FIBRIN DEGRADATION QUANT: CPT | Performed by: EMERGENCY MEDICINE

## 2023-10-24 PROCEDURE — 99284 EMERGENCY DEPT VISIT MOD MDM: CPT

## 2023-10-24 PROCEDURE — 36415 COLL VENOUS BLD VENIPUNCTURE: CPT

## 2023-10-24 PROCEDURE — 99215 OFFICE O/P EST HI 40 MIN: CPT | Performed by: NURSE PRACTITIONER

## 2023-10-24 PROCEDURE — 93010 ELECTROCARDIOGRAM REPORT: CPT

## 2023-10-24 PROCEDURE — 84484 ASSAY OF TROPONIN QUANT: CPT | Performed by: EMERGENCY MEDICINE

## 2023-10-24 PROCEDURE — 71046 X-RAY EXAM CHEST 2 VIEWS: CPT | Performed by: NURSE PRACTITIONER

## 2023-10-24 PROCEDURE — 80048 BASIC METABOLIC PNL TOTAL CA: CPT | Performed by: EMERGENCY MEDICINE

## 2023-10-24 PROCEDURE — U0002 COVID-19 LAB TEST NON-CDC: HCPCS | Performed by: NURSE PRACTITIONER

## 2023-10-24 PROCEDURE — 93000 ELECTROCARDIOGRAM COMPLETE: CPT | Performed by: NURSE PRACTITIONER

## 2023-10-24 PROCEDURE — 80047 BASIC METABLC PNL IONIZED CA: CPT | Performed by: NURSE PRACTITIONER

## 2023-10-24 PROCEDURE — 85025 COMPLETE CBC W/AUTO DIFF WBC: CPT | Performed by: NURSE PRACTITIONER

## 2023-10-24 PROCEDURE — 71045 X-RAY EXAM CHEST 1 VIEW: CPT | Performed by: EMERGENCY MEDICINE

## 2023-10-24 PROCEDURE — 84484 ASSAY OF TROPONIN QUANT: CPT | Performed by: NURSE PRACTITIONER

## 2023-10-24 PROCEDURE — 93005 ELECTROCARDIOGRAM TRACING: CPT

## 2023-10-24 PROCEDURE — 84443 ASSAY THYROID STIM HORMONE: CPT | Performed by: EMERGENCY MEDICINE

## 2023-10-24 NOTE — ED INITIAL ASSESSMENT (HPI)
S: pt presents to ed with EKG that she was told was abnormal after going to urgent care for generalized malaise, unwell feeling and tightness in the chest.

## 2023-10-24 NOTE — ED INITIAL ASSESSMENT (HPI)
Pt states symptoms began Friday, pt states starting with fatigue. Pt states having a mild cough and some tightness in chest. Pt states feels like when she had bronchitis. Pt denies NVD or fever.

## 2023-10-25 LAB
ATRIAL RATE: 86 BPM
ATRIAL RATE: 92 BPM
P AXIS: 69 DEGREES
P AXIS: 72 DEGREES
P-R INTERVAL: 138 MS
P-R INTERVAL: 150 MS
Q-T INTERVAL: 374 MS
Q-T INTERVAL: 374 MS
QRS DURATION: 82 MS
QRS DURATION: 90 MS
QTC CALCULATION (BEZET): 447 MS
QTC CALCULATION (BEZET): 462 MS
R AXIS: 65 DEGREES
R AXIS: 72 DEGREES
T AXIS: 52 DEGREES
T AXIS: 69 DEGREES
VENTRICULAR RATE: 86 BPM
VENTRICULAR RATE: 92 BPM

## 2023-11-06 NOTE — H&P
[facility] - Gastroenterology                                                                                                               Reason for consult: eval    Requesting physician or provider: Magalys Jones DO    Chief Complaint   Patient presents with    Colonoscopy Screening       HPI:   Laquita Mccabe is a 53year old year-old female with history of ADD, anxiety, depression:    she is here today for evaluation  prior to cln  She had chek 2 mutation and first c-scope/egd at [facility] w/ Dr. Azael Raman (2018)   IH  No polyps  FINAL DIAGNOSIS  A. (Random gastric biopsies): Multiple pieces of gastric antral and fundal   mucosa showing congestion and mild chronic gastritis. No Helicobacter,   atrophy, intestinal metaplasia or dysplasia. she moves her bowels every other day. Occasional straining. she denies brbpr and/or melena. she denies acid reflux and/or heartburn. she denies dysphagia, odynophagia and/or globus. she denies abdominal pain. she denies nausea and/or vomiting. she denies recent change in appetite and/or unintentional weight loss.     NSAIDS: no  Tobacco: no  Alcohol: rare  Marijuana: gummy rare  Illicit drugs: no    No FH GI malignancy    No history of adverse reaction to sedation  No MELITA  No anticoagulants  No pacemaker/defibrillator  No pain medications and/or sleep aides      Wt Readings from Last 6 Encounters:   23 164 lb (74.4 kg)   23 160 lb (72.6 kg)   23 165 lb (74.8 kg)   23 170 lb (77.1 kg)   22 155 lb (70.3 kg)   22 155 lb (70.3 kg)        History, Medications, Allergies, ROS:      Past Medical History:   Diagnosis Date    ADD (attention deficit disorder)     Anxiety     Bicornuate uterus     Depression     Monoallelic mutation of CHEK2 gene in female patient       Past Surgical History:   Procedure Laterality Date          HYSTEROSCOPY,WITH SAMPLING  2020    with endometrial polypectomy + bicornuate uterus. MASTECTOMY LEFT  07/2019    MASTECTOMY RIGHT  07/2019    PRIOR MYOMECTOMY  2020    For polyp      Family Hx:   Family History   Problem Relation Age of Onset    Cancer Father         prostate cancer    Prostate Cancer Father 52    Cancer Mother     Breast Cancer Mother 50    Arthritis Mother         RA    Cancer Maternal Grandmother     Breast Cancer Maternal Grandmother 61    Cancer Paternal Grandmother     Breast Cancer Paternal Grandmother 61    No Known Problems Brother     Ovarian Cancer Neg     Colon Cancer Neg       Social History:   Social History     Socioeconomic History    Marital status:    Occupational History    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 Drug use: Never    Sexual activity: Yes     Partners: Male   Other Topics Concern    Blood Transfusions No   Social History Narrative    Live with  and 2 children        Medications (Active prior to today's visit):  Current Outpatient Medications   Medication Sig Dispense Refill    albuterol 108 (90 Base) MCG/ACT Inhalation Aero Soln       PEG 3350-KCl-Na Bicarb-NaCl (TRILYTE) 420 g Oral Recon Soln Take prep as directed by gastro office. May substitute with Trilyte/generic equivalent if needed. 4000 mL 0    Phentermine HCl 30 MG Oral Cap Take 1 capsule (30 mg total) by mouth every morning. 30 capsule 2    buPROPion  MG Oral Tablet 24 Hr Take 1 tablet (300 mg total) by mouth daily. Drospirenone-Ethinyl Estradiol (HILARIO) 3-0.02 MG Oral Tab Take 1 tablet by mouth daily. 84 tablet 3    ferrous sulfate 325 (65 FE) MG Oral Tab EC Take 1 tablet (325 mg total) by mouth daily with breakfast.      Doxylamine Succinate, Sleep, (UNISOM SLEEPTABS OR) Take 2 tablets by mouth at bedtime.       cholecalciferol 25 MCG (1000 UT) Oral Cap Take 1 capsule (1,000 Units total) by mouth. Calcium 500-125 MG-UNIT Oral Tab Take by mouth. Multiple Vitamins-Minerals (DAILY MULTI OR) Take by mouth.      methylphenidate 20 MG Oral Tab Take 1 tablet (20 mg total) by mouth every morning. naltrexone 50 MG Oral Tab       PEG 3350-KCl-Na Bicarb-NaCl (TRILYTE) 420 g Oral Recon Soln Take prep as directed by gastro office. May substitute with Trilyte/generic equivalent if needed. (Patient not taking: Reported on 7/13/2023) 4000 mL 0       Allergies:  No Known Allergies    ROS:   CONSTITUTIONAL: negative for fevers, chills, sweats and weight loss  EYES Negative for red eyes, yellow eyes, changes in vision  HEENT: Negative for dysphagia and hoarseness  RESPIRATORY: Negative for cough and shortness of breath  CARDIOVASCULAR: Negative for chest pain, palpitations  GASTROINTESTINAL: See HPI  GENITOURINARY: Negative for dysuria and frequency  MUSCULOSKELETAL: Negative for arthralgias and myalgias  NEUROLOGICAL: Negative for dizziness and headaches  BEHAVIOR/PSYCH: Negative for anxiety and poor appetite    PHYSICAL EXAM:   Blood pressure 104/71, pulse 93, height 5' 7\" (1.702 m), weight 164 lb (74.4 kg), last menstrual period 09/05/2023, not currently breastfeeding. GEN: WD/WN, NAD  HEENT: Supple symmetrical, trachea midline  CV: RRR, the extremities are warm and well perfused   LUNGS: No increased work of breathing  ABDOMEN: No scars, normal bowel sounds, soft, non-tender, non-distended no rebound or guarding, no masses, no hepatomegaly  MSK: No redness, no warmth, no swelling of joints  SKIN: No jaundice, no erythema, no rashes  HEMATOLOGIC: No bleeding, no bruising  NEURO: Alert and interactive, normal gait    Labs/Imaging/Procedures:     Cln /egd 2/2018    Findings: The examined esophagus was normal.      The entire examined stomach was normal. Biopsies were taken with a cold       forceps for histology.       The duodenal bulb and second portion of the duodenum were normal.                                                                                    Impression:        - Normal esophagus.                    - Normal stomach. Biopsied.                    - Normal duodenal bulb and second portion of the duodenum. Recommendation:    - Resume previous diet. - Repeat upper endoscopy in 5 years. Findings:      Non-bleeding internal hemorrhoids were found. The hemorrhoids were mild. The exam was otherwise without abnormality on direct and retroflexion       views. Impression:        - Non-bleeding internal hemorrhoids. - The examination was otherwise normal on direct and                     retroflexion views. - No specimens collected. Recommendation:    - Discharge patient to home.                    - Repeat colonoscopy in 5 years. .  ASSESSMENT/PLAN:   Nazia Garay is a 53year old year-old female with history of ADD, anxiety, depression:    #crc screening  #check 2 mutation  #constipatin  Last cl[facility]/egd 2018 at [facility] and did not have polyps. Gastric bx neg for hp, I'm, dysplasia. 5 y surveillance interval recommended for both cln and egd. She denies change in bm, brbpr, and/or melena. She has chronic, mild constipation and adivsed use of fiber supplement. She is w/o upper gi complaint. No fhx gi malignancy. Plan for cln/egd    -fibercon or citrucel    1. Schedule colonoscopy/egd with [facility] w/ Dr. Jose Cisneros or Dr. Isaías Perez [Diagnosis: crc screening, chek2 mutuation]    2.  bowel prep from pharmacy (split trilyte)    3. Hold oral iron supplement,  phentermine 7 days prior to procedure    4. Read all bowel prep instructions carefully.  Bowel prep instructions can also be found online at:  [email or web address]     5. AVOID seeds, nuts, popcorn, raw fruits and vegetables for 3 days before procedure    6. You MAY need to go for COVID testing 72 hours before procedure. The testing team will call you a few days before your procedure to discuss with you if testing is required. If you are asked to go for COVID testing and do not completed the test, the procedure cannot be performed. 7. If you start any NEW medication after your visit today, please notify us. Certain medications (like iron or weight loss medications) will need to be held before the procedure, or the procedure cannot be performed safely. Orders This Visit:  No orders of the defined types were placed in this encounter. Meds This Visit:  Requested Prescriptions     Signed Prescriptions Disp Refills    PEG 3350-KCl-Na Bicarb-NaCl (TRILYTE) 420 g Oral Recon Soln 4000 mL 0     Sig: Take prep as directed by gastro office. May substitute with Trilyte/generic equivalent if needed. Imaging & Referrals:  None    ENDOSCOPIC RISK BENEFIT DISCUSSION: I described the procedure in great detail with the patient. I discussed the risks and benefits, including but not limited to: bleeding, perforation, infection, anesthesia complications, and even death. Patient will be NPO after midnight and will have a person physically present at time of pick-up to drive patient home. Patient verbalized understanding and agrees to proceed with procedure as planned. Arsh Wilson. Jose Mercado, APRN   11/8/2023        This note was partially prepared using [employer] [facility] Aud[employer] Verify voice recognition dictation software. As a result, errors may occur. When identified, these errors have been corrected.  While every attempt is made to correct errors during dictation, discrepancies may still exist.

## 2023-11-08 ENCOUNTER — TELEPHONE (OUTPATIENT)
Dept: GASTROENTEROLOGY | Facility: CLINIC | Age: 48
End: 2023-11-08

## 2023-11-08 ENCOUNTER — OFFICE VISIT (OUTPATIENT)
Dept: GASTROENTEROLOGY | Facility: CLINIC | Age: 48
End: 2023-11-08

## 2023-11-08 VITALS
HEART RATE: 93 BPM | HEIGHT: 67 IN | SYSTOLIC BLOOD PRESSURE: 104 MMHG | WEIGHT: 164 LBS | BODY MASS INDEX: 25.74 KG/M2 | DIASTOLIC BLOOD PRESSURE: 71 MMHG

## 2023-11-08 DIAGNOSIS — Z15.09 MONOALLELIC MUTATION OF CHEK2 GENE IN FEMALE PATIENT: Primary | ICD-10-CM

## 2023-11-08 DIAGNOSIS — Z12.11 COLON CANCER SCREENING: ICD-10-CM

## 2023-11-08 DIAGNOSIS — Z15.01: ICD-10-CM

## 2023-11-08 DIAGNOSIS — K59.00 CONSTIPATION, UNSPECIFIED CONSTIPATION TYPE: ICD-10-CM

## 2023-11-08 DIAGNOSIS — Z12.11 COLON CANCER SCREENING: Primary | ICD-10-CM

## 2023-11-08 DIAGNOSIS — C18.9: ICD-10-CM

## 2023-11-08 DIAGNOSIS — Z15.01 MONOALLELIC MUTATION OF CHEK2 GENE IN FEMALE PATIENT: Primary | ICD-10-CM

## 2023-11-08 DIAGNOSIS — Z15.89 MONOALLELIC MUTATION OF CHEK2 GENE IN FEMALE PATIENT: Primary | ICD-10-CM

## 2023-11-08 DIAGNOSIS — Z15.02 MONOALLELIC MUTATION OF CHEK2 GENE IN FEMALE PATIENT: Primary | ICD-10-CM

## 2023-11-08 PROCEDURE — S0285 CNSLT BEFORE SCREEN COLONOSC: HCPCS | Performed by: NURSE PRACTITIONER

## 2023-11-08 PROCEDURE — 3078F DIAST BP <80 MM HG: CPT | Performed by: NURSE PRACTITIONER

## 2023-11-08 PROCEDURE — 3074F SYST BP LT 130 MM HG: CPT | Performed by: NURSE PRACTITIONER

## 2023-11-08 PROCEDURE — 3008F BODY MASS INDEX DOCD: CPT | Performed by: NURSE PRACTITIONER

## 2023-11-08 RX ORDER — METHYLPHENIDATE HYDROCHLORIDE 20 MG/1
1 TABLET ORAL EVERY MORNING
COMMUNITY

## 2023-11-08 RX ORDER — POLYETHYLENE GLYCOL 3350, SODIUM CHLORIDE, SODIUM BICARBONATE, POTASSIUM CHLORIDE 420; 11.2; 5.72; 1.48 G/4L; G/4L; G/4L; G/4L
POWDER, FOR SOLUTION ORAL
Qty: 4000 ML | Refills: 0 | Status: SHIPPED | OUTPATIENT
Start: 2023-11-08

## 2023-11-08 RX ORDER — ALBUTEROL SULFATE 90 UG/1
AEROSOL, METERED RESPIRATORY (INHALATION)
COMMUNITY

## 2023-11-08 RX ORDER — NALTREXONE HYDROCHLORIDE 50 MG/1
TABLET, FILM COATED ORAL
COMMUNITY

## 2023-11-08 NOTE — PATIENT INSTRUCTIONS
1. Schedule colonoscopy/egd with [facility] w/ Dr. Kaylen Dennis or Dr. Vladimir Benson [Diagnosis: crc screening, chek2 mutuation]    2.  bowel prep from pharmacy (split trilyte)    3. Hold oral iron supplement,  phentermine 7 days prior to procedure    4. Read all bowel prep instructions carefully. Bowel prep instructions can also be found online at:  [email or web address]     5. AVOID seeds, nuts, popcorn, raw fruits and vegetables for 3 days before procedure    6. You MAY need to go for COVID testing 72 hours before procedure. The testing team will call you a few days before your procedure to discuss with you if testing is required. If you are asked to go for COVID testing and do not completed the test, the procedure cannot be performed. 7. If you start any NEW medication after your visit today, please notify us. Certain medications (like iron or weight loss medications) will need to be held before the procedure, or the procedure cannot be performed safely.

## 2023-11-08 NOTE — TELEPHONE ENCOUNTER
Scheduled for: Colonoscopy 46995/80425/EGD [address]   Provider Name: Dr Yefri Hernandez   Date: Wed 2/21/2024   Location: [facility]   Sedation: [facility]   Time: 9:45 am  Prep: split trilyte   Meds/Allergies Reconciled?: NKDA  Diagnosis with codes: colon screening Z12.11, check 2 mutation Z15.01   Was patient informed to call insurance with codes (Y/N): Yes   Referral sent?: Yes   [facility] or [facility]  notified?: I sent an electronic request to Endo Scheduling and received a confirmation today. Medication Orders: patient denies taking methylphenidate   Pt is aware to NOT take iron pills, herbal meds and diet supplements (Phentermine) for 7 days before exam. Also to NOT take any form of alcohol, recreational drugs and any forms of ED meds 24 hours before exam.      Misc Orders:       Further instructions given by staff:  Instructions given in office and pt verbalized understanding

## 2023-12-19 ENCOUNTER — OFFICE VISIT (OUTPATIENT)
Facility: CLINIC | Age: 48
End: 2023-12-19
Payer: COMMERCIAL

## 2023-12-19 ENCOUNTER — OFFICE VISIT (OUTPATIENT)
Dept: PULMONOLOGY | Facility: CLINIC | Age: 48
End: 2023-12-19

## 2023-12-19 VITALS
HEIGHT: 67 IN | WEIGHT: 162 LBS | HEART RATE: 96 BPM | SYSTOLIC BLOOD PRESSURE: 109 MMHG | DIASTOLIC BLOOD PRESSURE: 71 MMHG | OXYGEN SATURATION: 98 % | RESPIRATION RATE: 14 BRPM | BODY MASS INDEX: 25.43 KG/M2

## 2023-12-19 VITALS
HEART RATE: 68 BPM | WEIGHT: 162 LBS | OXYGEN SATURATION: 97 % | HEIGHT: 67 IN | DIASTOLIC BLOOD PRESSURE: 78 MMHG | BODY MASS INDEX: 25.43 KG/M2 | SYSTOLIC BLOOD PRESSURE: 120 MMHG

## 2023-12-19 DIAGNOSIS — E66.3 OVERWEIGHT (BMI 25.0-29.9): Primary | ICD-10-CM

## 2023-12-19 DIAGNOSIS — R09.81 NASAL SINUS CONGESTION: ICD-10-CM

## 2023-12-19 DIAGNOSIS — R05.2 SUBACUTE COUGH: ICD-10-CM

## 2023-12-19 DIAGNOSIS — F98.8 ATTENTION DEFICIT DISORDER (ADD) WITHOUT HYPERACTIVITY: ICD-10-CM

## 2023-12-19 DIAGNOSIS — Z72.0 TOBACCO ABUSE: Primary | ICD-10-CM

## 2023-12-19 PROCEDURE — 99214 OFFICE O/P EST MOD 30 MIN: CPT | Performed by: FAMILY MEDICINE

## 2023-12-19 PROCEDURE — 99203 OFFICE O/P NEW LOW 30 MIN: CPT | Performed by: INTERNAL MEDICINE

## 2023-12-19 PROCEDURE — 3008F BODY MASS INDEX DOCD: CPT | Performed by: FAMILY MEDICINE

## 2023-12-19 PROCEDURE — 3078F DIAST BP <80 MM HG: CPT | Performed by: INTERNAL MEDICINE

## 2023-12-19 PROCEDURE — 3074F SYST BP LT 130 MM HG: CPT | Performed by: FAMILY MEDICINE

## 2023-12-19 PROCEDURE — 3008F BODY MASS INDEX DOCD: CPT | Performed by: INTERNAL MEDICINE

## 2023-12-19 PROCEDURE — 3078F DIAST BP <80 MM HG: CPT | Performed by: FAMILY MEDICINE

## 2023-12-19 PROCEDURE — 3074F SYST BP LT 130 MM HG: CPT | Performed by: INTERNAL MEDICINE

## 2023-12-19 RX ORDER — PHENTERMINE HYDROCHLORIDE 37.5 MG/1
18.75 TABLET ORAL 2 TIMES DAILY
Qty: 30 TABLET | Refills: 0 | Status: SHIPPED | OUTPATIENT
Start: 2023-12-19

## 2023-12-19 RX ORDER — AMOXICILLIN AND CLAVULANATE POTASSIUM 875; 125 MG/1; MG/1
1 TABLET, FILM COATED ORAL 2 TIMES DAILY
Qty: 14 TABLET | Refills: 0 | Status: SHIPPED | OUTPATIENT
Start: 2023-12-19 | End: 2023-12-26

## 2023-12-19 RX ORDER — FLUTICASONE PROPIONATE 50 MCG
2 SPRAY, SUSPENSION (ML) NASAL DAILY
COMMUNITY

## 2023-12-19 NOTE — PROGRESS NOTES
Dear Rohit Sheffield:           As you know, Josefina Yusuf is a 80-year-old female smoker who I am now evaluating for occult neoplasm. HISTORY OF PRESENT ILLNESS: The patient has a history of the Chek 2 status post bilateral mastectomy. She also has a history of tobacco use having smoked 1 to 2 packs/week from the age of [age] and then occasional cigarettes thereafter up until recently. She is very worried about the possibility of occult lung neoplasm. She is essentially asymptomatic with respect to the chest as she denies hemoptysis, chest pain, pleurisy, personal or family history of deep venous thrombosis, TB exposure, shortness of breath. However, she does have a cough. There has been no weight loss or night sweats. There is a family history of sarcoidosis in her father. PAST MEDICAL AND SURGICAL HISTORY:   1. Depression and anxiety bilateral mastectomy CHEK2 mutation    SOCIAL HISTORY: , 2 kids, still with an occasional cigarette although smoked 1 to 2 packs/week between the ages of [age]. [employer]  previously for the [employer], occasional alcohol. FAMILY HISTORY: Mother with OBS, father alive and well    ALLERGIES TO MEDICATIONS: None    MEDICATIONS: Phentermine multivitamin ferrous sulfate birth control pill bupropion    REVIEW OF SYSTEMS: Review of Systems:  Vision normal. Ear nose and throat normal. Bowel normal. Bladder function normal. No depression. No thyroid disease. No lymphatic system concerns. No rash. Muscles and joints unremarkable. No weight loss no weight gain. PHYSICAL EXAMINATION: Physical Examination:  Vital signs normal. HEENT examination is unremarkable with pupils equal round and reactive to light and accommodation. Neck without adenopathy, thyromegaly, JVD nor bruit. Lungs clear to auscultation and percussion. Cardiac regular rate and rhythm no murmur. Abdomen nontender, without hepatosplenomegaly and no mass appreciable.  Extremities and Musculoskeletal without clubbing cyanosis nor edema, and mobility acceptable. Neurologic grossly intact with symmetric tone and strength and reflex. LABORATORY: Chest x-ray unremarkable    ASSESSMENT AND PLAN:  PROBLEM 1.  Screening for colon neoplasm-this young woman is very concerned about the possibility of occult lung neoplasm. I think the likelihood is extremely low; however, she does have a cough and has smoked for approximately 30 years albeit minimally lately. She does not qualify for the former low-dose CT screening program; however, I am going to go ahead and a noncontrast CT to screen and reassure. RECOMMENDATIONS:  1. Noncontrast CT scan of the chest  2. Smoking cessation  3. Smoking cessation literature provided  4. Annual flu shot  5. COVID booster  6. Contact me immediately if any new respiratory problem. I am delighted to assist in Erika's care.             With warmest regards,     Sylwia Park MD  Medical Director, [address], [facility]  Medical Director, [facility]

## 2024-01-16 ENCOUNTER — PATIENT MESSAGE (OUTPATIENT)
Facility: CLINIC | Age: 49
End: 2024-01-16

## 2024-01-25 DIAGNOSIS — E66.3 OVERWEIGHT (BMI 25.0-29.9): ICD-10-CM

## 2024-01-26 RX ORDER — PHENTERMINE HYDROCHLORIDE 37.5 MG/1
18.75 TABLET ORAL 2 TIMES DAILY
Qty: 30 TABLET | Refills: 2 | Status: SHIPPED | OUTPATIENT
Start: 2024-01-26

## 2024-01-26 RX ORDER — PHENTERMINE HYDROCHLORIDE 37.5 MG/1
18.75 TABLET ORAL 2 TIMES DAILY
Qty: 30 TABLET | Refills: 0 | Status: SHIPPED | OUTPATIENT
Start: 2024-01-26 | End: 2024-01-26

## 2024-01-26 NOTE — TELEPHONE ENCOUNTER
I left detailed message on [employer] verified # [phone] making aware of Rx sent and advised physical in April; also making aware of [employer] message being sent with recommendation. [1st attempt]    RN Triage - please check if patient read Rivono message, if not please make 2nd attempt to call

## 2024-01-26 NOTE — TELEPHONE ENCOUNTER
Please review; protocol failed/ has no protocol        Please see patients MyChart Message     Erika DA SILVA Em Triage [id number] hours ago (1:57 PM)     AL  Refills have been requested for the following medications:         Phentermine HCl 37.5 MG Oral Tab [Lolita Wesley]      Patient Comment: MELL Wesley.  I think this has worked well.  If you are able to send over refills, that would be great.  Thank you       Requested Prescriptions   Pending Prescriptions Disp Refills    Phentermine HCl 37.5 MG Oral Tab 30 tablet 0     Sig: Take 0.5 tablets (18.75 mg total) by mouth in the morning and 0.5 tablets (18.75 mg total) before bedtime.       There is no refill protocol information for this order        Recent Outpatient Visits              1 month ago Tobacco abuse    [facility] Zeke Fallon MD    Office Visit    1 month ago Overweight (BMI 25.0-29.9)    [facility] Lolita Wesley DO    Office Visit    2 months ago Monoallelic mutation of CHEK2 gene in female patient    [facility] Kalli Chang APRN    Office Visit    4 months ago Overweight (BMI 25.0-29.9)    [facility] Lolita Wesley DO    Office Visit    6 months ago Encounter for surveillance of contraceptive pills    [facility] - OB/GYN Dayanna Garrison MD    Office Visit          Future Appointments         Provider Department Appt Notes    In 2 weeks [facility] CT [address] CT [facility]    In 3 weeks SHERRY GRIMM [facility] CLN/EGD @ from [facility] - patient called/aware

## 2024-02-12 ENCOUNTER — PATIENT MESSAGE (OUTPATIENT)
Facility: CLINIC | Age: 49
End: 2024-02-12

## 2024-02-13 ENCOUNTER — TELEPHONE (OUTPATIENT)
Facility: CLINIC | Age: 49
End: 2024-02-13

## 2024-02-13 NOTE — TELEPHONE ENCOUNTER
[employer]fruits message sent with instruction per protocol.     Future Appointments   Date Time Provider Department Center   2/21/2024  9:45 AM GRIM[facility], PROCEDURE [id number] None   2/27/2024 10:30 AM [facility] CT RM[facility] SCAN [facility]   5/7/2024 10:00 AM Lolita Wesley,  [facility] 14 FP [facility] 10 OP         From: Erika Goyal  To: Lolita Wesley  Sent: 2/12/2024  9:52 AM CST  Subject: Bladder Infection    Hello  I have developed a bladder infection.  It burns when I urinate and I keep feeling like I have to pee nonstop.  I had one a long time ago and know what this feels like.  Are you able to send over some medicine to help?      thank you,  Erika Goyal

## 2024-02-26 DIAGNOSIS — E66.3 OVERWEIGHT (BMI 25.0-29.9): ICD-10-CM

## 2024-02-27 RX ORDER — PHENTERMINE HYDROCHLORIDE 37.5 MG/1
18.75 TABLET ORAL 2 TIMES DAILY
Qty: 30 TABLET | Refills: 2 | Status: SHIPPED | OUTPATIENT
Start: 2024-02-27

## 2024-02-27 NOTE — TELEPHONE ENCOUNTER
Please review. Protocol failed/ No protocol.    Requested Prescriptions   Pending Prescriptions Disp Refills    Phentermine HCl 37.5 MG Oral Tab 30 tablet 2     Sig: Take 0.5 tablets (18.75 mg total) by mouth in the morning and 0.5 tablets (18.75 mg total) before bedtime.       Controlled Substance Medication Failed - 2/26/2024  9:29 AM        Failed - This medication is a controlled substance - forward to provider to refill             Recent Outpatient Visits              2 months ago Tobacco abuse    [facility] Zeke Fallon MD    Office Visit    2 months ago Overweight (BMI 25.0-29.9)    [facility] Lolita Wesley DO    Office Visit    3 months ago Monoallelic mutation of CHEK2 gene in female patient    [facility] Kalli Chang APRN    Office Visit    5 months ago Overweight (BMI 25.0-29.9)    [facility] Lolita Wesley DO    Office Visit    7 months ago Encounter for surveillance of contraceptive pills    [facility] - OB/GYN Dayanna Garrison MD    Office Visit            Future Appointments         Provider Department Appt Notes    In 2 months Lolita Wesley DO [facility] NA    In 2 months SHERRY GRIMM [facility] Colon/GABRIELLA platt/ mac @ [facility]

## 2024-05-07 ENCOUNTER — OFFICE VISIT (OUTPATIENT)
Facility: CLINIC | Age: 49
End: 2024-05-07
Payer: COMMERCIAL

## 2024-05-07 ENCOUNTER — LAB ENCOUNTER (OUTPATIENT)
Dept: LAB | Facility: REFERENCE LAB | Age: 49
End: 2024-05-07
Attending: FAMILY MEDICINE
Payer: COMMERCIAL

## 2024-05-07 VITALS
WEIGHT: 160 LBS | SYSTOLIC BLOOD PRESSURE: 110 MMHG | BODY MASS INDEX: 25.11 KG/M2 | HEART RATE: 87 BPM | HEIGHT: 67 IN | DIASTOLIC BLOOD PRESSURE: 70 MMHG | OXYGEN SATURATION: 98 %

## 2024-05-07 DIAGNOSIS — G25.81 RESTLESS LEG SYNDROME: ICD-10-CM

## 2024-05-07 DIAGNOSIS — R79.89 ELEVATED SERUM CREATININE: ICD-10-CM

## 2024-05-07 DIAGNOSIS — Z00.00 ENCOUNTER FOR ROUTINE ADULT HEALTH EXAMINATION WITHOUT ABNORMAL FINDINGS: Primary | ICD-10-CM

## 2024-05-07 DIAGNOSIS — Z00.00 ENCOUNTER FOR ROUTINE ADULT HEALTH EXAMINATION WITHOUT ABNORMAL FINDINGS: ICD-10-CM

## 2024-05-07 DIAGNOSIS — R53.83 FATIGUE, UNSPECIFIED TYPE: ICD-10-CM

## 2024-05-07 DIAGNOSIS — N39.3 STRESS INCONTINENCE OF URINE: ICD-10-CM

## 2024-05-07 PROBLEM — E78.2 MIXED HYPERLIPIDEMIA: Status: ACTIVE | Noted: 2024-05-07

## 2024-05-07 LAB
ALBUMIN SERPL-MCNC: 4.5 G/DL (ref 3.2–4.8)
ALBUMIN/GLOB SERPL: 1.7 {RATIO} (ref 1–2)
ALP LIVER SERPL-CCNC: 62 U/L
ALT SERPL-CCNC: 28 U/L
ANION GAP SERPL CALC-SCNC: 8 MMOL/L (ref 0–18)
AST SERPL-CCNC: 30 U/L (ref ?–34)
BASOPHILS # BLD AUTO: 0.04 X10(3) UL (ref 0–0.2)
BASOPHILS NFR BLD AUTO: 0.5 %
BILIRUB SERPL-MCNC: 0.4 MG/DL (ref 0.3–1.2)
BUN BLD-MCNC: 9 MG/DL (ref 9–23)
BUN/CREAT SERPL: 8.2 (ref 10–20)
CALCIUM BLD-MCNC: 9.8 MG/DL (ref 8.7–10.4)
CHLORIDE SERPL-SCNC: 109 MMOL/L (ref 98–112)
CHOLEST SERPL-MCNC: 225 MG/DL (ref ?–200)
CO2 SERPL-SCNC: 24 MMOL/L (ref 21–32)
CREAT BLD-MCNC: 1.1 MG/DL
DEPRECATED RDW RBC AUTO: 40.9 FL (ref 35.1–46.3)
EGFRCR SERPLBLD CKD-EPI 2021: 62 ML/MIN/1.73M2 (ref 60–?)
EOSINOPHIL # BLD AUTO: 0.11 X10(3) UL (ref 0–0.7)
EOSINOPHIL NFR BLD AUTO: 1.5 %
ERYTHROCYTE [DISTWIDTH] IN BLOOD BY AUTOMATED COUNT: 11.8 % (ref 11–15)
EST. AVERAGE GLUCOSE BLD GHB EST-MCNC: 94 MG/DL (ref 68–126)
FASTING PATIENT LIPID ANSWER: YES
FASTING STATUS PATIENT QL REPORTED: YES
GLOBULIN PLAS-MCNC: 2.6 G/DL (ref 2–3.5)
GLUCOSE BLD-MCNC: 83 MG/DL (ref 70–99)
HBA1C MFR BLD: 4.9 % (ref ?–5.7)
HCT VFR BLD AUTO: 44.9 %
HDLC SERPL-MCNC: 72 MG/DL (ref 40–59)
HGB BLD-MCNC: 15.5 G/DL
IMM GRANULOCYTES # BLD AUTO: 0.02 X10(3) UL (ref 0–1)
IMM GRANULOCYTES NFR BLD: 0.3 %
LDLC SERPL CALC-MCNC: 137 MG/DL (ref ?–100)
LYMPHOCYTES # BLD AUTO: 1.71 X10(3) UL (ref 1–4)
LYMPHOCYTES NFR BLD AUTO: 22.6 %
MCH RBC QN AUTO: 32.4 PG (ref 26–34)
MCHC RBC AUTO-ENTMCNC: 34.5 G/DL (ref 31–37)
MCV RBC AUTO: 93.9 FL
MONOCYTES # BLD AUTO: 0.44 X10(3) UL (ref 0.1–1)
MONOCYTES NFR BLD AUTO: 5.8 %
NEUTROPHILS # BLD AUTO: 5.24 X10 (3) UL (ref 1.5–7.7)
NEUTROPHILS # BLD AUTO: 5.24 X10(3) UL (ref 1.5–7.7)
NEUTROPHILS NFR BLD AUTO: 69.3 %
NONHDLC SERPL-MCNC: 153 MG/DL (ref ?–130)
OSMOLALITY SERPL CALC.SUM OF ELEC: 290 MOSM/KG (ref 275–295)
PLATELET # BLD AUTO: 278 10(3)UL (ref 150–450)
POTASSIUM SERPL-SCNC: 4.2 MMOL/L (ref 3.5–5.1)
PROT SERPL-MCNC: 7.1 G/DL (ref 5.7–8.2)
RBC # BLD AUTO: 4.78 X10(6)UL
SODIUM SERPL-SCNC: 141 MMOL/L (ref 136–145)
TRIGL SERPL-MCNC: 92 MG/DL (ref 30–149)
TSI SER-ACNC: 1.45 MIU/ML (ref 0.55–4.78)
VIT D+METAB SERPL-MCNC: 39.9 NG/ML (ref 30–100)
VLDLC SERPL CALC-MCNC: 17 MG/DL (ref 0–30)
WBC # BLD AUTO: 7.6 X10(3) UL (ref 4–11)

## 2024-05-07 PROCEDURE — 80053 COMPREHEN METABOLIC PANEL: CPT

## 2024-05-07 PROCEDURE — 80061 LIPID PANEL: CPT

## 2024-05-07 PROCEDURE — 83036 HEMOGLOBIN GLYCOSYLATED A1C: CPT

## 2024-05-07 PROCEDURE — 99396 PREV VISIT EST AGE 40-64: CPT | Performed by: FAMILY MEDICINE

## 2024-05-07 PROCEDURE — 36415 COLL VENOUS BLD VENIPUNCTURE: CPT

## 2024-05-07 PROCEDURE — 84443 ASSAY THYROID STIM HORMONE: CPT

## 2024-05-07 PROCEDURE — 82306 VITAMIN D 25 HYDROXY: CPT

## 2024-05-07 PROCEDURE — 85025 COMPLETE CBC W/AUTO DIFF WBC: CPT

## 2024-05-07 NOTE — PROGRESS NOTES
HPI:   Erika Goyal is a 48 year old female who presents for a complete physical exam.     Reports her mom had early onset dementia in her 60's, and this concerns her. She denies forgetting big tasks or struggling with things at work.   Still struggling with restless leg syndrome, and tried taking iron supplements without much improvement.   She does leak urine with running and other activities at times. Also gets up to urinate overnight and urinates frequently throughout the day. Drinks several cups of coffee a day, but also drinks a lot of water.   Started compounded Semaglutide nine weeks ago, and has been feeling more fatigued than normal. Tolerating it well otherwise.     Last pap: 7/2022 and normal   Last mammogram: No longer needed due to bilateral mastectomy   Menses: Regular, monthly cycles, but very light   Contraception:  OCP's  Previous colonoscopy: 2018-scheduled repeat on 5/222   History of STD's: Genital HSV  History of intimate partner violence: None  Family hx of breast, ovarian, cervical or colon CA: Mom and several relatives with breast cancer  Diet and exercise: Drinks several cups of coffee a day. Also does drink a lot of water. Eating fewer sweets and smaller portions since starting compounded Semaglutide. Also eating a balance of fruits and vegetables. Runs daily.   Immunizations:  Tdap: 2023, Covid: Completed     REVIEW OF SYSTEMS:   GENERAL: fatigue   SKIN: denies any unusual skin lesions  EYES: no vision problems  BREAST: no lumps or masses, no nipple discharge   LUNGS: denies shortness of breath  CARDIOVASCULAR: denies chest pain  GI: denies abdominal pain,  No constipation or diarrhea, no hematochezia  : denies dysuria, vaginal discharge or itching, polyuria and nocturia   NEURO: denies headaches, restless leg syndrome  PSYCHE: denies depression or anxiety          Current Outpatient Medications   Medication Sig Dispense Refill    SEMAGLUTIDE-WEIGHT MANAGEMENT SC Inject into the  skin.      buPROPion  MG Oral Tablet 24 Hr Take 1 tablet (300 mg total) by mouth daily.      Drospirenone-Ethinyl Estradiol (HILARIO) 3-0.02 MG Oral Tab Take 1 tablet by mouth daily. 84 tablet 3    ferrous sulfate 325 (65 FE) MG Oral Tab EC Take 1 tablet (325 mg total) by mouth daily with breakfast.      Doxylamine Succinate, Sleep, (UNISOM SLEEPTABS OR) Take 2 tablets by mouth at bedtime.      cholecalciferol 25 MCG (1000 UT) Oral Cap Take 1 capsule (1,000 Units total) by mouth.      Calcium 500-125 MG-UNIT Oral Tab Take by mouth.      Multiple Vitamins-Minerals (DAILY MULTI OR) Take by mouth.      PEG 3350-KCl-Na Bicarb-NaCl (TRILYTE) 420 g Oral Recon Soln Take prep as directed by gastro office. May substitute with Trilyte/generic equivalent if needed. (Patient not taking: Reported on 2024) 4000 mL 0     No Known Allergies   Past Medical History:    ADD (attention deficit disorder)    Anxiety    Arthritis    Bicornuate uterus    Depression    Monoallelic mutation of CHEK2 gene in female patient    Osteoarthritis      Past Surgical History:   Procedure Laterality Date          Colonoscopy      Hysteroscopy,with sampling  2020    with endometrial polypectomy + bicornuate uterus.     Mastectomy left  2019    Mastectomy right  2019          Prior myomectomy      For polyp    Skin surgery        Family History   Problem Relation Age of Onset    Cancer Father         prostate cancer    Prostate Cancer Father 49    Heart Disorder Father     Cancer Mother     Breast Cancer Mother 48    Arthritis Mother         RA    Dementia Mother     Cancer Maternal Grandmother     Breast Cancer Maternal Grandmother 60    Dementia Maternal Grandfather     Cancer Paternal Grandmother     Breast Cancer Paternal Grandmother 60    No Known Problems Brother     Ovarian Cancer Neg     Colon Cancer Neg       Social History:   Social History     Socioeconomic History    Marital status:    Occupational  History    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    Drug use: Not Currently    Sexual activity: Yes     Partners: Male   Other Topics Concern    Blood Transfusions No    Caffeine Concern No    Stress Concern No    Weight Concern Yes    Special Diet No    Exercise Yes    Seat Belt Yes   Social History Narrative    Live with  and 2 children     Social Determinants of Health      Received from [facility], [facility]    Social Connections    Received from [facility], [facility]    Housing Stability     Occ: Higher . : Yes. Children: 2.         EXAM:     Wt Readings from Last 6 Encounters:   05/07/24 160 lb (72.6 kg)   12/19/23 162 lb (73.5 kg)   12/19/23 162 lb (73.5 kg)   11/08/23 164 lb (74.4 kg)   09/19/23 160 lb (72.6 kg)   07/13/23 165 lb (74.8 kg)     Body mass index is 25.06 kg/m².   /70   Pulse 87   Ht 5' 7\" (1.702 m)   Wt 160 lb (72.6 kg)   LMP 04/05/2024 (Approximate)   SpO2 98%   BMI 25.06 kg/m²     GENERAL: well developed, well nourished, in no apparent distress   SKIN: no rashes, no suspicious lesions  HEENT: atraumatic, normocephalic, throat clear; normal dentition  EYES: PERRLA, EOMI, conjunctiva are clear  NECK: supple, no adenopathy or thyroid masses   BREAST: deferred, history of mastectomy   LUNGS: clear to auscultation  CARDIO: RRR without murmur  GI: good bowel sounds, no masses, HSM or tenderness  : deferred, sees gynecologist   EXTREMITIES: no edema    Cholesterol, Total (mg/dL)   Date Value   04/19/2023 237 (H)   02/08/2022 233 (H)     HDL Cholesterol (mg/dL)   Date Value   04/19/2023 92 (H)   02/08/2022 98 (H)     LDL Cholesterol (mg/dL)   Date  Value   04/19/2023 124 (H)   02/08/2022 121 (H)      ASSESSMENT AND PLAN:   Erika Goyal is a 48 year old female who presents for a complete physical exam.  Encounter Diagnoses   Name Primary?    Encounter for routine adult health examination without abnormal findings Yes    Restless leg syndrome     Stress incontinence of urine     Fatigue, unspecified type      Orders Placed This Encounter   Procedures    TSH W Reflex To Free T4 [E]    Vitamin D [E]    Lipid Panel [E]    Hemoglobin A1C (Glycohemoglobin) [E]    Comp Metabolic Panel (14) [E]    CBC W Differential W Platelet [E]     Check TSH and vitamin d with labs due to fatigue, though likely due to side effects from Semaglutide.  Restless leg syndrome and urinary incontinence likely made worse by excessive caffeine intake and advised cutting back on this. Also to see neurologist as she does not have iron deficiency and symptoms have not improved.  Referral to PT given for pelvic floor therapy due to stress incontinence.     Discussed with patient the following:  -Monthly breast self-exam  -Breast cancer screening/mammograms and clinical breast exams  -Cervical cancer screening/pap smears  -Colon cancer screening/colonoscopy  -Adequate calcium and Vitamin D intake to prevent osteoporosis  -Bone density screening for osteopenia/osteoporosis  -Healthy diet including adequate intake of vegetables and fruits, appropriate portion sizes, minimizing highly concentrated carbohydrate foods  -Exercising 30 minutes a day most days of the week   -Diabetes screening which she desires  -Cholesterol screening which she desires  -Recommendation for yearly influenza vaccine  -Need for Tdap once as an adult and Td booster every 10 years  -Need for Zoster vaccine for patients >= 50  -Contraception: OCP's  -STI screening (GC/Chlamydia/HIV): Not indicated  -Hepatitis C screening for all adults between the ages of 18 and 79: checked and negative     All questions were answered  during the visit and the patient verbalizes understanding. She will return in one year for next WWE or sooner as needed.    Meds & Refills for this Visit:  Requested Prescriptions      No prescriptions requested or ordered in this encounter       Imaging & Consults:  PHYSICAL THERAPY EXTERNAL    Lolita Wesley DO  5/7/2024  10:12 AM

## 2024-05-17 ENCOUNTER — TELEPHONE (OUTPATIENT)
Facility: CLINIC | Age: 49
End: 2024-05-17

## 2024-05-17 DIAGNOSIS — Z12.11 COLON CANCER SCREENING: ICD-10-CM

## 2024-05-17 DIAGNOSIS — Z15.01 MONOALLELIC MUTATION OF CHEK2 GENE IN FEMALE PATIENT: Primary | ICD-10-CM

## 2024-05-17 DIAGNOSIS — Z15.89 MONOALLELIC MUTATION OF CHEK2 GENE IN FEMALE PATIENT: Primary | ICD-10-CM

## 2024-05-17 DIAGNOSIS — Z15.02 MONOALLELIC MUTATION OF CHEK2 GENE IN FEMALE PATIENT: Primary | ICD-10-CM

## 2024-05-17 DIAGNOSIS — Z15.09 MONOALLELIC MUTATION OF CHEK2 GENE IN FEMALE PATIENT: Primary | ICD-10-CM

## 2024-05-20 NOTE — TELEPHONE ENCOUNTER
Please see TE 11/08/23 for documentation. Left message to call back and reschedule procedures. If patient calls back please transfer to schedulers.

## 2024-05-31 NOTE — LETTER
11/25/2022    Erika Lara        [address] [address]            Dear Carlo Neely,      [facility] records indicate that you are due for an appointment for a Colonoscopy and EGD or upper endoscopy with a physician at [address] [facility],[address] - Gastroenterology. Our doctors are booking out about 3-5 months for procedures. Please call our office to schedule a phone screening appointment to plan for the procedure(s). Your medical well-being is important to us. If your insurance requires a referral, please call your primary care office to request one.       Thank you,      The Physicians and Staff at [facility].8

## 2024-06-11 NOTE — TELEPHONE ENCOUNTER
Left Message To Call Back to re-schedule CLN/EGD procedure    See office visit TE 11/08/23    1. Schedule colonoscopy/egd with CARLOS ENRIQUE platt/ Dr. Howe or Dr. Robbins [Diagnosis: crc screening, chek2 mutuation]     2.  bowel prep from pharmacy (split trilyte)     3. Hold oral iron supplement,  phentermine 7 days prior to procedure

## 2024-06-18 NOTE — TELEPHONE ENCOUNTER
Scheduled for:  Colonoscopy [address]  EGD [address]   Provider Name:  Dr. Howe   Date:  10/16/2024  Location:    [facility]   Sedation:  mac  Time:  9:45 (patient is aware arrival time is 8:45)  Prep:  trilyte   Meds/Allergies Reconciled?:  Physician reviewed   Diagnosis with codes:  crc screening  Z12.11, chek2 mutuation Z15.01  Was patient informed to call insurance with codes (Y/N):  Yes, I confirmed [employer] insurance with the patient.   Referral sent?:  Referral was sent at the time of electronic surgical scheduling.  EM or [facility] notified?:  I sent an electronic request to Endo Scheduling and received a confirmation today.   Medication Orders:  This patient verbally confirmed that she is not taking:   Iron, blood thinners, BP meds, and is not diabetic   Not latex allergy, Not PCN allergy and does not have a pacemaker     Misc Orders:  I discussed the prep instructions with the patient which she verbally understood and is aware that I will mychart the instructions today.    Further instructions given by staff:

## 2024-08-12 DIAGNOSIS — N92.6 IRREGULAR MENSES: ICD-10-CM

## 2024-08-12 RX ORDER — DROSPIRENONE AND ETHINYL ESTRADIOL 0.02-3(28)
1 KIT ORAL DAILY
Qty: 84 TABLET | Refills: 0 | Status: SHIPPED | OUTPATIENT
Start: 2024-08-12 | End: 2024-09-03

## 2024-08-21 ENCOUNTER — HOSPITAL ENCOUNTER (OUTPATIENT)
Age: 49
Discharge: HOME | End: 2024-08-21
Payer: COMMERCIAL

## 2024-08-21 VITALS
HEART RATE: 87 BPM | RESPIRATION RATE: 20 BRPM | SYSTOLIC BLOOD PRESSURE: 112 MMHG | DIASTOLIC BLOOD PRESSURE: 69 MMHG | OXYGEN SATURATION: 100 % | TEMPERATURE: 97 F

## 2024-08-21 DIAGNOSIS — U07.1 COVID-19: Primary | ICD-10-CM

## 2024-08-21 LAB — SARS-COV-2 RNA RESP QL NAA+PROBE: DETECTED

## 2024-08-21 PROCEDURE — U0002 COVID-19 LAB TEST NON-CDC: HCPCS | Performed by: NURSE PRACTITIONER

## 2024-08-21 PROCEDURE — 99213 OFFICE O/P EST LOW 20 MIN: CPT | Performed by: NURSE PRACTITIONER

## 2024-08-21 NOTE — ED PROVIDER NOTES
Patient Seen in: [facility]      History     Chief Complaint   Patient presents with    Cough     Entered by patient    Headache    Sinus Problem     Stated Complaint: Cough    Subjective:   47 y/o female with medical conditions as noted below presents with c/o intermittent nasal congestion/runny nose that started 3 weeks ago.  States was feeling a little better but 2 days ago symptoms worsen and she developed headache, generalized fatigue, nonproductive cough.  Negative home COVID test yesterday.  No fever/chills, dizziness, lightheadedness, chest pain, shortness of breath, abdominal pain, nausea/vomiting/diarrhea.            Objective:   Past Medical History:    ADD (attention deficit disorder)    Anxiety    Arthritis    Bicornuate uterus    Depression    Monoallelic mutation of CHEK2 gene in female patient    Osteoarthritis              Past Surgical History:   Procedure Laterality Date          Colonoscopy      Hysteroscopy,with sampling  2020    with endometrial polypectomy + bicornuate uterus.     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    Drug use: Not Currently    Sexual activity: Yes     Partners: Male   Other Topics Concern    Blood Transfusions No    Caffeine Concern No    Stress Concern No    Weight Concern Yes    Special Diet No    Exercise Yes    Seat Belt Yes   Social History Narrative    Live with  and 2 children     Social Determinants of Health      Received from [facility], [facility]  Center    Social Connections    Received from [facility], [facility]    Housing Stability              Review of Systems   Constitutional:  Positive for fatigue. Negative for chills and fever.   HENT:  Positive for congestion and sinus pressure. Negative for sore throat.    Respiratory:  Positive for cough. Negative for shortness of breath.    Cardiovascular:  Negative for chest pain.   Gastrointestinal:  Negative for abdominal pain, diarrhea, nausea and vomiting.   Neurological:  Positive for headaches. Negative for dizziness and light-headedness.   All other systems reviewed and are negative.      Positive for stated Chief Complaint: Cough (Entered by patient), Headache, and Sinus Problem    Other systems are as noted in HPI.  Constitutional and vital signs reviewed.      All other systems reviewed and negative except as noted above.    Physical Exam     ED Triage Vitals [08/21/24 1310]   /69   Pulse 87   Resp 20   Temp 97.2 °F (36.2 °C)   Temp src Temporal   SpO2 100 %   O2 Device None (Room air)       Current Vitals:   Vital Signs  BP: 112/69  Pulse: 87  Resp: 20  Temp: 97.2 °F (36.2 °C)  Temp src: Temporal    Oxygen Therapy  SpO2: 100 %  O2 Device: None (Room air)            Physical Exam  Vitals and nursing note reviewed.   Constitutional:       General: She is not in acute distress.     Appearance: Normal appearance. She is not ill-appearing.   HENT:      Head: Normocephalic.      Right Ear: Tympanic membrane and external ear normal.      Left Ear: Tympanic membrane and external ear normal.      Nose: Congestion present.      Mouth/Throat:      Mouth: Mucous membranes are moist.      Pharynx: Oropharynx is clear. Uvula midline.      Tonsils: No tonsillar exudate.   Cardiovascular:      Rate and Rhythm: Normal rate and regular rhythm.   Pulmonary:      Effort: Pulmonary effort is normal.      Breath sounds: Normal breath sounds.   Musculoskeletal:         General:  Normal range of motion.   Skin:     General: Skin is warm.   Neurological:      Mental Status: She is alert and oriented to person, place, and time.   Psychiatric:         Behavior: Behavior is cooperative.               ED Course     Labs Reviewed   RAPID SARS-COV-2 BY PCR - Abnormal; Notable for the following components:       Result Value    Rapid SARS-CoV-2 by PCR Detected (*)     All other components within normal limits                      MDM                                         Medical Decision Making  Patient is well-appearing.  I discussed differentials with patient including but not limited to viral uri vs sinusitis.  Rapid COVID positive  Patient requesting Paxlovid. Information discussed with patient  Push fluids, cool mist humidifier, saline nasal spray, good hand washing. Self isolation  RX Paxlovid sent to pharmacy.   otc meds prn  Fu with PCP. Return/ ED precautions discussed      Problems Addressed:  COVID-19: acute illness or injury    Amount and/or Complexity of Data Reviewed  Labs: ordered. Decision-making details documented in ED Course.        Disposition and Plan     Clinical Impression:  1. COVID-19         Disposition:  Discharge  8/21/2024  1:59 pm    Follow-up:  Lolita Wesley DO  [address] [address]  [phone]    Schedule an appointment as soon as possible for a visit       [facility] Emergency Department  [address] [id number] [phone]    If symptoms worsen          Medications Prescribed:  Discharge Medication List as of 8/21/2024  2:00 PM        START taking these medications    Details   nirmatrelvir-ritonavir 300-100 MG Oral Tablet Therapy Pack Take two nirmatrelvir tablets (300mg) with one ritonavir tablet (100mg) together twice daily for 5 days., Normal, Disp-30 tablet, R-0

## 2024-08-21 NOTE — ED INITIAL ASSESSMENT (HPI)
Pt has been sick for 3 weeks with cough, fatigue, runny nose headache and sinus pressure. Pt had a negative Covid test yesterday.

## 2024-08-31 DIAGNOSIS — N92.6 IRREGULAR MENSES: ICD-10-CM

## 2024-09-03 RX ORDER — DROSPIRENONE AND ETHINYL ESTRADIOL 0.02-3(28)
1 KIT ORAL DAILY
Qty: 84 TABLET | Refills: 0 | Status: SHIPPED | OUTPATIENT
Start: 2024-09-03 | End: 2024-09-16

## 2024-09-16 ENCOUNTER — OFFICE VISIT (OUTPATIENT)
Dept: OBGYN CLINIC | Facility: CLINIC | Age: 49
End: 2024-09-16
Payer: COMMERCIAL

## 2024-09-16 VITALS
HEIGHT: 67 IN | WEIGHT: 150 LBS | DIASTOLIC BLOOD PRESSURE: 74 MMHG | SYSTOLIC BLOOD PRESSURE: 114 MMHG | BODY MASS INDEX: 23.54 KG/M2

## 2024-09-16 DIAGNOSIS — Z01.419 ENCOUNTER FOR GYNECOLOGICAL EXAMINATION WITHOUT ABNORMAL FINDING: Primary | ICD-10-CM

## 2024-09-16 DIAGNOSIS — R35.0 URINARY FREQUENCY: ICD-10-CM

## 2024-09-16 DIAGNOSIS — N92.6 IRREGULAR MENSES: ICD-10-CM

## 2024-09-16 RX ORDER — TOLTERODINE TARTRATE 2 MG/1
2 TABLET, EXTENDED RELEASE ORAL DAILY
Qty: 90 TABLET | Refills: 3 | Status: SHIPPED | OUTPATIENT
Start: 2024-09-16

## 2024-09-16 RX ORDER — DROSPIRENONE AND ETHINYL ESTRADIOL 0.02-3(28)
1 KIT ORAL DAILY
Qty: 84 TABLET | Refills: 3 | Status: SHIPPED | OUTPATIENT
Start: 2024-09-16

## 2024-09-16 NOTE — PROGRESS NOTES
GYN H&P     2024  1:18 PM    CC: Patient is here for annual and OCP refill    HPI: Patient is a 48 year old  for annual. Plans colonoscopy 10/8    + c/o urinary frequency (chronic) with rare leak with cough/sneeze exercise. Tried PT.   Patient's last menstrual period was 2024 (approximate).    OB History    Para Term  AB Living   2 2 2     2   SAB IAB Ectopic Multiple Live Births           2      # Outcome Date GA Lbr Alex/2nd Weight Sex Type Anes PTL Lv   2 Term  38w0d  7 lb 14 oz (3.572 kg) F NORMAL SPONT   ZO   1 Term  38w0d  6 lb 13 oz (3.09 kg) F Caesarean   ZO      Birth Comments: Breech       GYN hx:    Hx Prior Abnormal Pap: No  Pap Date: 22  Pap Result Notes: WNL        Past Medical History:    ADD (attention deficit disorder)    Anxiety    Arthritis    Bicornuate uterus    Depression    Monoallelic mutation of CHEK2 gene in female patient    Osteoarthritis     Past Surgical History:   Procedure Laterality Date    Breast reconstruction Bilateral           Colonoscopy      Hysteroscopy,with sampling  2020    with endometrial polypectomy + bicornuate uterus.     Mastectomy left  2019    Mastectomy right  2019    due to Chek 2          Prior myomectomy      For polyp    Skin surgery       No Known Allergies  Family History   Problem Relation Age of Onset    Cancer Father         prostate cancer    Prostate Cancer Father 49    Heart Disorder Father     Cancer Mother     Breast Cancer Mother 48    Arthritis Mother         RA    Dementia Mother     Cancer Maternal Grandmother     Breast Cancer Maternal Grandmother 60    Dementia Maternal Grandfather     Cancer Paternal Grandmother     Breast Cancer Paternal Grandmother 60    No Known Problems Brother     Ovarian Cancer Neg     Colon Cancer Neg      Social History     Socioeconomic History    Marital status:    Occupational History    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    Drug use: Not Currently    Sexual activity: Yes     Partners: Male     Social History     Social History Narrative    Live with  and 2 children       Medications reviewed. See active list.     /74   Ht 67\"   Wt 150 lb (68 kg)   LMP 04/05/2024 (Approximate)   BMI 23.49 kg/m²       Exam:   GENERAL: well developed, well nourished, in no apparent distress  SKIN: no rashes, no suspicious lesions  HEENT: normal  NECK: supple; no thyroidmegaly, no adenopathy  BREASTS: symmetrical, nontender, no palpable masses or nodes, no nipple discharge, no skin changes, no dippling, no palpable axillary adenopathy  ABDOMEN: Soft, non distended; non tender, no masses.  Liver and spleen non-tender, no enlargement. No palpable hernias  GYNE/:  External Genitalia: Normal appearing, no lesions, normal hair distribution   Urethral meatus appear wnl, no abnormal discharge or lesions noted.   Bladder: well supported, urethra wnl, no palpable tenderness or masses, no discharge  Vagina: normal pink mucosa, no lesions, normal clear discharge.   Uterus: midline, mobile, non-tender, firm and smooth  Cervix: pink, no lesions grossly visible, no discharge  Adnexa: non tender, no palpable masses, normal size  Anus:  No lesions or visible hemorrhoids        A/P: Patient is 48 year old female     1. Irregular menses  - Drospirenone-Ethinyl Estradiol 3-0.02 MG Oral Tab; Take 1 tablet by mouth daily.  Dispense: 84 tablet; Refill: 3    2. Urinary frequency  - tolterodine (DETROL) 2 MG Oral Tab; Take 1 tablet (2 mg total) by mouth daily.  Dispense: 90 tablet; Refill: 3  - Urogynecology Referral - In Network    3. Encounter for gynecological examination without abnormal finding      Dayanna Garrison MD

## 2024-10-11 ENCOUNTER — TELEPHONE (OUTPATIENT)
Facility: CLINIC | Age: 49
End: 2024-10-11

## 2024-10-11 NOTE — TELEPHONE ENCOUNTER
Noted. WIll proceed with C-scope as planned.     At this time, not sure peer to peer will be helpful since guidelines show no clear need for EGD for surveillance.      Kalli - can you reach out to patient when you are back Monday to let her know we do not need EGD UNLESS she has new symptoms (eg weight loss, dyspepsia, etc).        Here are the recent NCCN recommendations for CHEK2 mutations, you can share that wit patient as well:    https://www.facingourrisk.org/info/hereditary-cancer-and-genetic-testing/hereditary-cancer-genes-and-risk/genes-by-name/chek2/risk-management

## 2024-10-11 NOTE — TELEPHONE ENCOUNTER
Dr. Howe,    Fax received for denial of EGD which is scheduled on 10/16/24.     Denial reason:    - Results of prior study showed need for the study to be repeated    - You had a change in your symptoms since the prior study was done.    We can schedule a peer to peer by calling [phone] (option 1) to see if they will overturn the decision. Case #[id number]     Are you able to do a peer to peer? Or should we wait for Kalli?

## 2024-10-14 NOTE — TELEPHONE ENCOUNTER
EGD Portion of procedure has been canceled. Please see telephone encounter 5/17/24 for documentation.

## 2024-10-14 NOTE — TELEPHONE ENCOUNTER
I contacted the pt. NO answer. I left a detailed message with the below.  Plan for c-scope only 10/16/24.     FYI scheduling.    Thanks,  Kalli

## 2024-10-15 ENCOUNTER — OFFICE VISIT (OUTPATIENT)
Dept: NEUROLOGY | Facility: CLINIC | Age: 49
End: 2024-10-15
Payer: COMMERCIAL

## 2024-10-15 VITALS — HEART RATE: 89 BPM | SYSTOLIC BLOOD PRESSURE: 104 MMHG | DIASTOLIC BLOOD PRESSURE: 74 MMHG

## 2024-10-15 DIAGNOSIS — Z81.8 FAMILY HISTORY OF DEMENTIA: Primary | ICD-10-CM

## 2024-10-15 PROCEDURE — 99203 OFFICE O/P NEW LOW 30 MIN: CPT | Performed by: OTHER

## 2024-10-15 NOTE — PROGRESS NOTES
[address], [address]  [facility] [address]  [phone]          [facility]  NEW PATIENT EVALUATION  Reason for Admission/Consultation:  family hx of early onset dementia    Requested by:   PCP: Lolita Wesley DO  Chief Complaint: Neurologic Problem (Family history of early Alzheimer's dementia. Patient not experiencing any memory issues at this time.)      HPI:  Erika Goyal is a 49 year old  woman w/ a pmhx of  anxiety, depression, ADHD,  who presents for  family history of early onset dementia.     Her maternal grandfather had dementia at age 59  Her  mom had sx at 62. She was never officially diagnosed. She passed away from  heart disease. She was dx w/ dementia at 66. She had been in a \"home\" for the past 4 years. Her mom was a heavy drinker.    She wants to be followed now. She wants to be on a preventative path.    Short-term Memory:  -Repeats questions/statements No  -Has been misplacing items around the house Yes  -Has difficulty remembering details of recent conversations within a few hours No  -Has difficulty remembering names of familiar people family or friends No  -Is more reliant on calendars and reminders Yes  -Has missed some appointments or major events due to memory changes No  -These changes have been gradually progressive since onset No    Long-term Memory:  -Difficulty remembering distant events from the past like childhood, previous employment, wedding No     Orientation:   -Oriented? Yes  -Has difficulty with time relationships - No  -Has not been driving and might get lost - No    Language:   -Has word-finding difficulty - Yes  -Has decreased fluency - No  -Has some minor difficulty understanding conversations, or difficulty with reading or writing - No    Executive function:   -General ability to handle and solve problems at the present time is significantly reduced from baseline -  No        Behavioral/personality:  -Depression: No  -Irritability: No  -Hallucinations: No  -Apathy: No  -Delusions: No  -Feels more anxious/worried: No  -Impatient, fidgety:Yes within normal limits  -Less interested in hobbies or social activities: No  -Acts impulsively, disinhibited: No  -Increased or decreased appetite, weight change: No    -Increased or decreased sleep, daytime fatigue: No  -Change in personality: No       Review and summation of prior records      ROS:  Pertinent positive and negatives per HPI.  All others were reviewed and negative.    Past Medical History:    ADD (attention deficit disorder)    Anxiety    Arthritis    Bicornuate uterus    Depression    Monoallelic mutation of CHEK2 gene in female patient    Osteoarthritis         Current Outpatient Medications:     Drospirenone-Ethinyl Estradiol 3-0.02 MG Oral Tab, Take 1 tablet by mouth daily., Disp: 84 tablet, Rfl: 3    tolterodine (DETROL) 2 MG Oral Tab, Take 1 tablet (2 mg total) by mouth daily., Disp: 90 tablet, Rfl: 3    SEMAGLUTIDE-WEIGHT MANAGEMENT SC, Inject into the skin., Disp: , Rfl:     buPROPion  MG Oral Tablet 24 Hr, Take 1 tablet (300 mg total) by mouth daily., Disp: , Rfl:     Doxylamine Succinate, Sleep, (UNISOM SLEEPTABS OR), Take 2 tablets by mouth at bedtime., Disp: , Rfl:     cholecalciferol 25 MCG (1000 UT) Oral Cap, Take 1 capsule (1,000 Units total) by mouth daily., Disp: , Rfl:     Calcium 500-125 MG-UNIT Oral Tab, Take 1 tablet by mouth daily., Disp: , Rfl:     Multiple Vitamins-Minerals (DAILY MULTI OR), Take by mouth., Disp: , Rfl:     PEG 3350-KCl-Na Bicarb-NaCl (TRILYTE) 420 g Oral Recon Soln, Take prep as directed by gastro office. May substitute with Trilyte/generic equivalent if needed. (Patient not taking: Reported on 5/7/2024), Disp: 4000 mL, Rfl: 0    ferrous sulfate 325 (65 FE) MG Oral Tab EC, Take 1 tablet (325 mg total) by mouth daily with breakfast., Disp: , Rfl:     Allergies[1]    Past Surgical  History:   Procedure Laterality Date    Breast reconstruction Bilateral           Colonoscopy      Hysteroscopy,with sampling  2020    with endometrial polypectomy + bicornuate uterus.     Mastectomy left  2019    Mastectomy right  2019    due to Chek 2          Prior myomectomy      For polyp    Skin surgery         Family History   Problem Relation Age of Onset    Cancer Father         prostate cancer    Prostate Cancer Father 49    Heart Disorder Father     Cancer Mother     Breast Cancer Mother 48    Arthritis Mother         RA    Dementia Mother     Cancer Maternal Grandmother     Breast Cancer Maternal Grandmother 60    Dementia Maternal Grandfather     Cancer Paternal Grandmother     Breast Cancer Paternal Grandmother 60    No Known Problems Brother     Ovarian Cancer Neg     Colon Cancer Neg        Social history:  History   Smoking Status    Former   Smokeless Tobacco    Never     History   Alcohol Use Not Currently     Comment: OCC.     History   Drug Use Unknown       Family History   Problem Relation Age of Onset    Cancer Father         prostate cancer    Prostate Cancer Father 49    Heart Disorder Father     Cancer Mother     Breast Cancer Mother 48    Arthritis Mother         RA    Dementia Mother     Cancer Maternal Grandmother     Breast Cancer Maternal Grandmother 60    Dementia Maternal Grandfather     Cancer Paternal Grandmother     Breast Cancer Paternal Grandmother 60    No Known Problems Brother     Ovarian Cancer Neg     Colon Cancer Neg        Objective:  Vitals  Blood pressure 104/74, pulse 89, last menstrual period 2024, not currently breastfeeding.  /74 (BP Location: Left arm, Patient Position: Sitting)   Pulse 89   LMP 2024 (Approximate)   There is no height or weight on file to calculate BMI.  Vitals:    10/15/24 1106   Patient Position: Sitting   BP Location: Left arm        Exam:  - General: appears stated age and no distress     -  Pulmonary:    No signs of respiratory distress.    Neurologic Exam  - Mental Status: Alert and attentive. .  Speech is spontaneous, fluent, and prosodic. Comprehension and repetition intact. Phrase length and rate are normal. No paraphasic errors, neologisms, anomia, acalculia, apraxia, anosognosia, or R/L confusion.   - Cranial Nerves: No gaze preference. Visual fields:normal.  Pupils are equally round and reactive to light  in a well lit room. EOMI. No nystagmus. No ptosis. V1 to V3 intact to LT and temperature in all 3 branches. No pathological facial asymmetry. No flattening of the nasolabial fold. .  Hearing grossly intact.  Tongue midline. No atrophy or fasiculations of the tongue noted. Palate and uvula elevate symmetrically.  Shoulder shrug symmetric.  - Fundoscopic exam:normal w/o hemorrhages, exudates, or papilledema.No attenuation. No pallor.  - Motor:  normal tone, normal bulk. No interosseous wasting. No flattening of hypothenar eminences.       Right Left     Motor Strength   Deltoids 5 5  Triceps 5 5  Biceps 5 5  Wrist Extensors 5 5   5 5   Hip Flexors 5 5   Knee extensors 5 5  Knee flexors 5 5  Plantar flexion 5 5  Dorsiflexion 5 5      Pronator drift: No pronator drift   Arm Rolling: No orbiting.   Finger Taps: Finger taps are symmetric in rate and amplitude.    Rapid movements: Rapid/fine movements are symmetric. As expected their dominant hand is slightly faster.   Foot Taps: Foot taps are symmetric.      Asterixis: No asterixis noted.   Tremor:       Reflexes:    C5 C6 C7  L4 S1   R 2+ 2+  2+ 2+   L 2+ 2+  2+ 2+   Adductor Spread: No adductor spread noted.    Frontal release signs:Not assessed.    Jaw Jerk:    Markus's sign:absent   Nonsustained clonus: Absent   Sustained clonus: Absent   - Sensory:   Light touch: normal  - Cerebellum: No truncal ataxia. No titubations. No dysmetria, no dysdiadochokinesis. No overshoot.   - Gait/station: Normal gait and station. Symmetric arm swing.      -  Plantar response: flexor bilaterally    Data reviewed    Test results/Imaging:   Lab Results   Component Value Date    TSH 1.451 05/07/2024     Lab Results   Component Value Date    HDL 72 (H) 05/07/2024     (H) 05/07/2024    TRIG 92 05/07/2024     Lab Results   Component Value Date    HGB 15.5 05/07/2024    HCT 44.9 05/07/2024    MCV 93.9 05/07/2024    WBC 7.6 05/07/2024    .0 05/07/2024      Lab Results   Component Value Date    BUN 9 05/07/2024    CA 9.8 05/07/2024    ALT 28 05/07/2024    AST 30 05/07/2024    ALB 4.5 05/07/2024     05/07/2024    K 4.2 05/07/2024     05/07/2024    CO2 24.0 05/07/2024      I have reviewed labs.    Performed an independent visualization of:   Imaging revealed:            Erika Goyal is a 49 year old female w/ a pmhx  of adhd depression and anxiety who presents for  recommendations on prevention of dementia.     Discussed MIND diet. Referred her to [facility]      1. Family history of dementia  - Specialty Other Referral - External  - Follow the MIND diet-     Green leafy vegetables (like spinach and salad greens): At least six servings a week   Other vegetables: At least one a day   Nuts: Five servings a week   Berries: Two or more servings a week   Beans: At least three servings a week   Whole grains.Three or more servings a day   Fish: Once a week   Poultry (like chicken or turkey): Two times a week   Olive oil: Use it as your main cooking oil.       No orders of the defined types were placed in this encounter.          Education/Instructions given to: patient   Barriers to Learning:None  Content: Refer to note above. Evaluation/Outcome: Verbalized understanding    This document is not intended to support charting by exception.  Sections left blank in a completed note should be presumed not to have been done.    Education and counseling provided to patient. Instructed patient to call my office or seek medical attention immediately if symptoms worsen.  All  questions were answered. All side effects of drugs were discussed.     Total time spent involved in the care of the patient including time spent writing the note, reviewing the labs, reviewing the relevant imaging, and face to face time was  32 minutes, more than 50% of the time was spent in counseling and/or coordination of care related to early onset dementia.       Issues discussed: Diagnosis and implications on future health, benefits and side effects of present and future medications, test results as well as further testing and medications required.    Return to clinic in: No follow-ups on file.    Talib Villeda DO  [facility] Neu[facility]   10/15/24  11:20 AM         [1] No Known Allergies

## 2024-10-15 NOTE — PATIENT INSTRUCTIONS
- Follow the MIND diet-     Green leafy vegetables (like spinach and salad greens): At least six servings a week   Other vegetables: At least one a day   Nuts: Five servings a week   Berries: Two or more servings a week   Beans: At least three servings a week   Whole grains.Three or more servings a day   Fish: Once a week   Poultry (like chicken or turkey): Two times a week   Olive oil: Use it as your main cooking oil.     https://journals.lww.com/neurotodayonline/pages/default.aspx    [facility] is on the the best neurology programs in the country  Look at memory and aging clinics.  [facility] and [facility] have memory and aging clinics.

## 2024-10-16 ENCOUNTER — ANESTHESIA (OUTPATIENT)
Dept: ENDOSCOPY | Age: 49
End: 2024-10-16
Payer: COMMERCIAL

## 2024-10-16 ENCOUNTER — ANESTHESIA EVENT (OUTPATIENT)
Dept: ENDOSCOPY | Age: 49
End: 2024-10-16
Payer: COMMERCIAL

## 2024-10-16 ENCOUNTER — HOSPITAL ENCOUNTER (OUTPATIENT)
Age: 49
Setting detail: HOSPITAL OUTPATIENT SURGERY
Discharge: HOME | End: 2024-10-16
Attending: INTERNAL MEDICINE | Admitting: INTERNAL MEDICINE
Payer: COMMERCIAL

## 2024-10-16 VITALS
OXYGEN SATURATION: 96 % | RESPIRATION RATE: 16 BRPM | BODY MASS INDEX: 21.19 KG/M2 | HEART RATE: 84 BPM | HEIGHT: 67 IN | WEIGHT: 135 LBS | DIASTOLIC BLOOD PRESSURE: 90 MMHG | SYSTOLIC BLOOD PRESSURE: 122 MMHG

## 2024-10-16 DIAGNOSIS — Z15.09 MONOALLELIC MUTATION OF CHEK2 GENE IN FEMALE PATIENT: ICD-10-CM

## 2024-10-16 DIAGNOSIS — Z15.01 MONOALLELIC MUTATION OF CHEK2 GENE IN FEMALE PATIENT: ICD-10-CM

## 2024-10-16 DIAGNOSIS — Z15.02 MONOALLELIC MUTATION OF CHEK2 GENE IN FEMALE PATIENT: ICD-10-CM

## 2024-10-16 DIAGNOSIS — Z12.11 COLON CANCER SCREENING: ICD-10-CM

## 2024-10-16 DIAGNOSIS — Z15.89 MONOALLELIC MUTATION OF CHEK2 GENE IN FEMALE PATIENT: ICD-10-CM

## 2024-10-16 PROBLEM — Q43.8 TORTUOUS COLON: Status: ACTIVE | Noted: 2024-10-16

## 2024-10-16 PROBLEM — K64.8 INTERNAL HEMORRHOIDS: Status: ACTIVE | Noted: 2024-10-16

## 2024-10-16 LAB — B-HCG UR QL: NEGATIVE

## 2024-10-16 PROCEDURE — 45378 DIAGNOSTIC COLONOSCOPY: CPT | Performed by: INTERNAL MEDICINE

## 2024-10-16 PROCEDURE — 99070 SPECIAL SUPPLIES PHYS/QHP: CPT | Performed by: INTERNAL MEDICINE

## 2024-10-16 RX ORDER — SODIUM CHLORIDE, SODIUM LACTATE, POTASSIUM CHLORIDE, CALCIUM CHLORIDE 600; 310; 30; 20 MG/100ML; MG/100ML; MG/100ML; MG/100ML
INJECTION, SOLUTION INTRAVENOUS CONTINUOUS
Status: DISCONTINUED | OUTPATIENT
Start: 2024-10-16 | End: 2024-10-16

## 2024-10-16 RX ORDER — LIDOCAINE HYDROCHLORIDE 10 MG/ML
INJECTION, SOLUTION EPIDURAL; INFILTRATION; INTRACAUDAL; PERINEURAL AS NEEDED
Status: DISCONTINUED | OUTPATIENT
Start: 2024-10-16 | End: 2024-10-16 | Stop reason: SURG

## 2024-10-16 RX ADMIN — LIDOCAINE HYDROCHLORIDE 50 MG: 10 INJECTION, SOLUTION EPIDURAL; INFILTRATION; INTRACAUDAL; PERINEURAL at 08:42:00

## 2024-10-16 RX ADMIN — SODIUM CHLORIDE, SODIUM LACTATE, POTASSIUM CHLORIDE, CALCIUM CHLORIDE: 600; 310; 30; 20 INJECTION, SOLUTION INTRAVENOUS at 08:42:00

## 2024-10-16 NOTE — OPERATIVE REPORT
COLONOSCOPY REPORT    Erika Goyal     10/12/1975 Age 49 year old   PCP Lolita Wesley DO Endoscopist Torrey Howe MD     Date of procedure: 10/16/24    Procedure: Colonoscopy     Pre-operative diagnosis: Screening    Post-operative diagnosis: Tortuous colon, internal hemorrhoids    Medications: MAC    Withdrawal time: 4 minutes    Procedure:  Informed consent was obtained from the patient after the risks of the procedure were discussed, including but not limited to bleeding, perforation, aspiration, infection, or possibility of a missed lesion. After discussions of the risks/benefits and alternatives to this procedure, as well as the planned sedation, the patient was placed in the left lateral decubitus position and begun on continuous blood pressure pulse oximetry and EKG monitoring and this was maintained throughout the procedure. Once an adequate level of sedation was obtained a digital rectal exam was completed. Then the lubricated tip of the [id number] diagnostic video colonoscope was inserted and advanced without difficulty to the cecum using the CO2 insufflation technique. The cecum was identified by localizing the trifold, the appendix and the ileocecal valve. Withdrawal was begun with thorough washing and careful examination of the colonic walls and folds. A routine second examination of the cecum/ascending colon was performed. Photodocumentation was obtained. The bowel prep was poor. Views of the colon were poor with washing. I then carefully withdrew the instrument from the patient who tolerated the procedure well.     Complications: none.    Findings:   1. No polyp(s) noted but lesions could have been easily missed.    2. Diverticulosis: none visualized.    3. Terminal ileum: the visualized mucosa appeared normal.    4. The visualized areas of colonic mucosa throughout the colon showed normal vascular pattern, without evidence of angioectasias or inflammation.     5. A retroflexed  view of the rectum revealed small internal hemorrhoids.    6. JOEL: normal rectal tone, no masses palpated.     Impression:   Poor preparation -retained seeds and solid debris. Unable to rule out polyps. No obvious obstruction noted as cecum reached.  Tortuous colon and internal hemorrhoids.    Recommend:  Repeat CLN in 3-6 months. If new signs or symptoms develop, colonoscopy may need to be repeated sooner.   Recommend enhanced bowel prep for next colonoscopy (3 days of dulcolax + bowel prep). Patient should be reminded NOT to consume seeds, nuts, corn, popcorn or raw fruits/vegetables for 5 days prior to next colonoscopy.   Will switch to Sutab bowel prep.  Strict clear liquid diet the day before procedure.    Specimens: none  Blood loss: <1 ml

## 2024-10-16 NOTE — ANESTHESIA PREPROCEDURE EVALUATION
Anesthesia PreOp Note    HPI:     Erika Goyal is a 49 year old female who presents for preoperative consultation requested by: WHIT Howe MD    Date of Surgery: 10/16/2024    Procedure(s):  COLONOSCOPY  Indication: Monoallelic mutation of CHEK2 gene in female patient / Colon cancer screening    Relevant Problems   No relevant active problems       NPO:  Last Liquid Consumption Date: 10/16/24  Last Liquid Consumption Time: 0100  Last Solid Consumption Date: 10/14/24  Last Solid Consumption Time: 2100  Last Liquid Consumption Date: 10/16/24          History Review:  Patient Active Problem List    Diagnosis Date Noted    Family history of dementia 10/15/2024    Mixed hyperlipidemia 2024    Binge eating disorder 2023    Attention deficit disorder (ADD) without hyperactivity 2023    Depression     Anxiety     Bicornuate uterus 2020    Monoallelic mutation of CHEK2 gene in female patient 2019    Osteoporosis 2013       Past Medical History:    ADD (attention deficit disorder)    Anxiety    Arthritis    Bicornuate uterus    Depression    Monoallelic mutation of CHEK2 gene in female patient    Osteoarthritis       Past Surgical History:   Procedure Laterality Date    Breast reconstruction Bilateral           Colonoscopy      Hysteroscopy,with sampling  2020    with endometrial polypectomy + bicornuate uterus.     Mastectomy left  2019    Mastectomy right  2019    due to Chek 2          Prior myomectomy      For polyp    Skin surgery         Prescriptions Prior to Admission[1]  Current Medications and Prescriptions Ordered in Epic[2]    Allergies[3]    Family History   Problem Relation Age of Onset    Cancer Father         prostate cancer    Prostate Cancer Father 49    Heart Disorder Father     Cancer Mother     Breast Cancer Mother 48    Arthritis Mother         RA    Dementia Mother     Cancer Maternal Grandmother     Breast Cancer Maternal  Grandmother 60    Dementia Maternal Grandfather     Cancer Paternal Grandmother     Breast Cancer Paternal Grandmother 60    No Known Problems Brother     Ovarian Cancer Neg     Colon Cancer Neg      Social History     Socioeconomic History    Marital status:    Occupational History    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    Drug use: Not Currently    Sexual activity: Yes     Partners: Male   Other Topics Concern    Blood Transfusions No    Caffeine Concern No    Stress Concern No    Weight Concern Yes    Special Diet No    Exercise Yes    Seat Belt Yes       Available pre-op labs reviewed.  Lab Results   Component Value Date    URINEPREG Negative 10/16/2024             Vital Signs:  Body mass index is 21.14 kg/m².   height is 1.702 m (5' 7\") and weight is 61.2 kg (135 lb). Her blood pressure is 119/74 and her pulse is 74. Her respiration is 11 and oxygen saturation is 100%.   Vitals:    10/14/24 1102 10/16/24 0738   BP:  119/74   Pulse:  74   Resp:  11   SpO2:  100%   Weight: 61.2 kg (135 lb)    Height: 1.702 m (5' 7\")         Anesthesia Evaluation     Patient summary reviewed and Nursing notes reviewed    No history of anesthetic complications   Airway   Mallampati: II  TM distance: >3 FB  Neck ROM: full  Dental - Dentition appears grossly intact     Pulmonary - negative ROS and normal exam   Cardiovascular - negative ROS and normal exam    Neuro/Psych    (+)  anxiety/panic attacks,  attention deficit disorder, depression      GI/Hepatic/Renal    (+) bowel prep    Endo/Other - negative ROS   Abdominal                  Anesthesia Plan:   ASA:  2  Plan:   MAC  Plan Comments: I have discussed the anesthetic plan, major risks and alternatives with the patient and answered all questions.  The patient desires to proceed with surgery and anesthesia as planned.     Informed Consent Plan and Risks Discussed With:  Patient      I have informed Erika Goyal and/or legal guardian or family member of the nature of the anesthetic plan, benefits, risks including possible dental damage if relevant, major complications, and any alternative forms of anesthetic management.   All of the patient's questions were answered to the best of my ability. The patient desires the anesthetic management as planned.  Osvaldo Zhou MD  10/16/2024 8:04 AM  Present on Admission:  **None**           [1]   Medications Prior to Admission   Medication Sig Dispense Refill Last Dose/Taking    Drospirenone-Ethinyl Estradiol 3-0.02 MG Oral Tab Take 1 tablet by mouth daily. 84 tablet 3 Taking    tolterodine (DETROL) 2 MG Oral Tab Take 1 tablet (2 mg total) by mouth daily. 90 tablet 3 Taking    buPROPion  MG Oral Tablet 24 Hr Take 1 tablet (300 mg total) by mouth daily.   Taking    Doxylamine Succinate, Sleep, (UNISOM SLEEPTABS OR) Take 2 tablets by mouth at bedtime.   Taking    cholecalciferol 25 MCG (1000 UT) Oral Cap Take 1 capsule (1,000 Units total) by mouth daily.   Taking    Calcium 500-125 MG-UNIT Oral Tab Take 1 tablet by mouth daily.   Taking    Multiple Vitamins-Minerals (DAILY MULTI OR) Take by mouth.   Taking    [] nirmatrelvir-ritonavir 300-100 MG Oral Tablet Therapy Pack Take two nirmatrelvir tablets (300mg) with one ritonavir tablet (100mg) together twice daily for 5 days. 30 tablet 0     SEMAGLUTIDE-WEIGHT MANAGEMENT SC Inject into the skin.   10/5/2024    PEG 3350-KCl-Na Bicarb-NaCl (TRILYTE) 420 g Oral Recon Soln Take prep as directed by gastro office. May substitute with Trilyte/generic equivalent if needed. (Patient not taking: Reported on 2024) 4000 mL 0     ferrous sulfate 325 (65 FE) MG Oral Tab EC Take 1 tablet (325 mg total) by mouth daily with breakfast.      [2]   Current  Facility-Administered Medications Ordered in Epic   Medication Dose Route Frequency Provider Last Rate Last Admin    lactated ringers infusion   Intravenous Continuous WHIT Howe MD         No current [facility]-ordered outpatient medications on file.   [3] No Known Allergies

## 2024-10-16 NOTE — ANESTHESIA POSTPROCEDURE EVALUATION
Patient: Erika Goyal    Procedure Summary       Date: 10/16/24 Room / Location: [facility] ENDOSCOPY 01 / [facility] ENDO    Anesthesia Start: 0842 Anesthesia Stop: 0904    Procedure: COLONOSCOPY Diagnosis:       Monoallelic mutation of CHEK2 gene in female patient      Colon cancer screening      (torturous colon hemorrhoids)    Surgeons: WHIT Howe MD Anesthesiologist: Osvaldo Zhou MD    Anesthesia Type: MAC ASA Status: 2            Anesthesia Type: MAC    Vitals Value Taken Time   /67 10/16/24 0904   Temp  10/16/24 0904   Pulse 84 10/16/24 0904   Resp 15 10/16/24 0904   SpO2 100 % 10/16/24 0904       [facility] AN Post Evaluation:   Patient Evaluated in PACU  Patient Participation: complete - patient participated  Level of Consciousness: awake and alert  Pain Management: adequate  Airway Patency:patent  Yes    Nausea/Vomiting: none  Cardiovascular Status: acceptable  Respiratory Status: acceptable and room air  Postoperative Hydration acceptable      Osvaldo Zhou MD  10/16/2024 9:04 AM

## 2024-10-16 NOTE — DISCHARGE INSTRUCTIONS
Home Care Instructions for Colonoscopy with Sedation    Diet:  - Resume your regular diet as tolerated unless otherwise instructed.  - Start with light meals to minimize bloating.  - Do not drink alcohol today.    Medication:  - If you have questions about resuming your normal medications, please contact your Primary Care Physician.    Activities:  - Take it easy today. Do not return to work today.  - Do not drive today.  - Do not operate any machinery today (including kitchen equipment).    Colonoscopy:  - You may notice some rectal \"spotting\" (a little blood on the toilet tissue) for a day or two after the exam. This is normal.  - If you experience any rectal bleeding (not spotting), persistent tenderness or sharp severe abdominal pains, oral temperature over 100 degrees Fahrenheit, light-headedness or dizziness, or any other problems, contact your doctor.    **If unable to reach your doctor, please go to the [facility] Emergency Room**    - Your referring physician will receive a full report of your examination.  - If you do not hear from your doctor's office within two weeks of your biopsy, please call them for your results.    You may be able to see your laboratory results in [employer] between 4 and 7 business days.  In some cases, your physician may not have viewed the results before they are released to [employer].  If you have questions regarding your results contact the physician who ordered the test/exam by phone or via [employer] by choosing \"Ask a Medical Question.\"

## 2024-10-16 NOTE — H&P
History & Physical Examination    Patient Name: Erika Goyal  MRN: [id number]  CSN: [id number]  YOB: 1975    Diagnosis: screening for colon cancer    Prescriptions Prior to Admission[1]  Current Facility-Administered Medications   Medication Dose Route Frequency    lactated ringers infusion   Intravenous Continuous     Facility-Administered Medications Ordered in Other Encounters   Medication Dose Route Frequency    lidocaine PF (Xylocaine-MPF) 1% injection   Intravenous PRN    propofol (Diprivan) 10 MG/ML injection   Intravenous PRN    propofol (Diprivan) 10 mg/mL infusion premix   Intravenous Continuous PRN       Allergies: Allergies[2]    Past Medical History:    ADD (attention deficit disorder)    Anxiety    Arthritis    Bicornuate uterus    Depression    Monoallelic mutation of CHEK2 gene in female patient    Osteoarthritis     Past Surgical History:   Procedure Laterality Date    Breast reconstruction Bilateral           Colonoscopy      Hysteroscopy,with sampling  2020    with endometrial polypectomy + bicornuate uterus.     Mastectomy left  2019    Mastectomy right  2019    due to Chek 2          Prior myomectomy      For polyp    Skin surgery       Family History   Problem Relation Age of Onset    Cancer Father         prostate cancer    Prostate Cancer Father 49    Heart Disorder Father     Cancer Mother     Breast Cancer Mother 48    Arthritis Mother         RA    Dementia Mother     Cancer Maternal Grandmother     Breast Cancer Maternal Grandmother 60    Dementia Maternal Grandfather     Cancer Paternal Grandmother     Breast Cancer Paternal Grandmother 60    No Known Problems Brother     Ovarian Cancer Neg     Colon Cancer Neg      Social History     Tobacco Use    Smoking status: Former    Smokeless tobacco: Never   Substance Use Topics    Alcohol use: Not Currently     Comment: OCC.       SYSTEM Check if Review is Normal Check if Physical Exam is Normal  If not normal, please explain:   HEENT [X ] [ X]    NECK  [X ] [ X]    HEART [X ] [ X]    LUNGS [X ] [ X]    ABDOMEN [X ] [ X]    EXTREMITIES [X ] [ X]    OTHER        I have discussed the risks and benefits and alternatives of the procedure with the patient/family.  They understand and agree to proceed with plan of care.   I have reviewed the History and Physical done within the last 30 days.  Any changes noted above.    EVAN Howe MD  [facility] - Gastroenterology  10/16/2024  8:46 AM                 [1]   Medications Prior to Admission   Medication Sig Dispense Refill Last Dose/Taking    Drospirenone-Ethinyl Estradiol 3-0.02 MG Oral Tab Take 1 tablet by mouth daily. 84 tablet 3 Taking    tolterodine (DETROL) 2 MG Oral Tab Take 1 tablet (2 mg total) by mouth daily. 90 tablet 3 Taking    buPROPion  MG Oral Tablet 24 Hr Take 1 tablet (300 mg total) by mouth daily.   Taking    Doxylamine Succinate, Sleep, (UNISOM SLEEPTABS OR) Take 2 tablets by mouth at bedtime.   Taking    cholecalciferol 25 MCG (1000 UT) Oral Cap Take 1 capsule (1,000 Units total) by mouth daily.   Taking    Calcium 500-125 MG-UNIT Oral Tab Take 1 tablet by mouth daily.   Taking    Multiple Vitamins-Minerals (DAILY MULTI OR) Take by mouth.   Taking    [] nirmatrelvir-ritonavir 300-100 MG Oral Tablet Therapy Pack Take two nirmatrelvir tablets (300mg) with one ritonavir tablet (100mg) together twice daily for 5 days. 30 tablet 0     SEMAGLUTIDE-WEIGHT MANAGEMENT SC Inject into the skin.   10/5/2024    PEG 3350-KCl-Na Bicarb-NaCl (TRILYTE) 420 g Oral Recon Soln Take prep as directed by gastro office. May substitute with Trilyte/generic equivalent if needed. (Patient not taking: Reported on 2024) 4000 mL 0     ferrous sulfate 325 (65 FE) MG Oral Tab EC Take 1 tablet (325 mg total) by mouth daily with breakfast.      [2] No Known Allergies

## 2024-10-29 ENCOUNTER — TELEPHONE (OUTPATIENT)
Facility: CLINIC | Age: 49
End: 2024-10-29

## 2024-10-29 NOTE — TELEPHONE ENCOUNTER
GI Scheduling Staff:   Please use prior orders to reschedule c-scope in 3-6 months.    Recommend enhanced bowel prep for next colonoscopy (3 days of dulcolax + bowel prep). Patient should be reminded NOT to consume seeds, nuts, corn, popcorn or raw fruits/vegetables for 5 days prior to next colonoscopy.     Please remind her to stick to STRICT clear liquid diet the day before the colonoscopy (other than eggs for breakfast the day before c-scope).    This time will switch to SUTAB since she could not tolerate bowel prep and had a poor prep.

## 2024-10-30 NOTE — TELEPHONE ENCOUNTER
ORDERS FROM OFFICE VISIT 11/08/23    1. Schedule colonoscopy with [facility] w/ Dr. Howe or Dr. Robbins [Diagnosis: crc screening, chek2 mutuation]  2.  bowel prep from pharmacy (split trilyte)  3. Hold oral iron supplement,  phentermine 7 days prior to procedure

## 2024-11-26 NOTE — TELEPHONE ENCOUNTER
Left message for patient to call back to schedule Colonoscopy.     Please transfer call to GI surgery scheduling

## 2024-12-04 ENCOUNTER — PATIENT MESSAGE (OUTPATIENT)
Dept: PULMONOLOGY | Facility: CLINIC | Age: 49
End: 2024-12-04

## 2024-12-06 ENCOUNTER — PATIENT MESSAGE (OUTPATIENT)
Dept: ADMINISTRATIVE | Age: 49
End: 2024-12-06

## 2024-12-06 NOTE — TELEPHONE ENCOUNTER
CT CHEST (CPT=71250)      [employer]  online, the following request is authorized     Service Order:[id number]   Valid: 12/06/24-06/04/2025        Authorization # pending member outreach      Notified pt via [facility]

## 2024-12-17 ENCOUNTER — HOSPITAL ENCOUNTER (OUTPATIENT)
Dept: CT IMAGING | Facility: HOSPITAL | Age: 49
Discharge: HOME | End: 2024-12-17
Attending: INTERNAL MEDICINE
Payer: COMMERCIAL

## 2024-12-17 DIAGNOSIS — Z72.0 TOBACCO ABUSE: ICD-10-CM

## 2024-12-17 DIAGNOSIS — R05.2 SUBACUTE COUGH: ICD-10-CM

## 2024-12-17 PROCEDURE — 71250 CT THORAX DX C-: CPT | Performed by: INTERNAL MEDICINE

## 2024-12-18 ENCOUNTER — PATIENT MESSAGE (OUTPATIENT)
Dept: PULMONOLOGY | Facility: CLINIC | Age: 49
End: 2024-12-18

## 2024-12-19 ENCOUNTER — TELEPHONE (OUTPATIENT)
Dept: PULMONOLOGY | Facility: CLINIC | Age: 49
End: 2024-12-19

## 2024-12-19 ENCOUNTER — PATIENT MESSAGE (OUTPATIENT)
Dept: PULMONOLOGY | Facility: CLINIC | Age: 49
End: 2024-12-19

## 2024-12-19 NOTE — TELEPHONE ENCOUNTER
Erika DA SILVA [facility] Clinical Staff (supporting Zeke Fallon MD)Yesterday (10:40 AM)     I saw the raw results from the CT chest scan (though when I went back in they weren't there).  When I looked it up this nodule doesn't sound overwhelming but that it should be followed.       LUNGS/PLEURA: A peripheral anterolateral left lower lobe 0.4 x 0.5 cm ovoid ground-glass nodule is evident (series 3, image 50). No airspace consolidation, pleural effusion, or pneumothorax is detected.       The other concern, unrelated to the lungs, is that it says it found spine/disc issues:BONES: Multilevel degenerative changes of the thoracic spine are apparent.     Would the doctor or one of the nurses be able to send me back their thoughts and any next steps that need to be taken?  Thanks

## 2024-12-19 NOTE — TELEPHONE ENCOUNTER
RN, I spoke to Erika regarding the results of her CT scan of the chest.  She has a newly identified groundglass nodule.  Please add to the calendar a repeat CT scan of the chest at the 1 year interval.

## 2024-12-19 NOTE — TELEPHONE ENCOUNTER
Erika DA SILVA [facility] Clinical Staff (supporting Zeke Fallon MD)1 hour ago (10:32 AM)     HI - I know it's a busy time but pretty anxious about the results from my CT scan on Dec 17th as their was a GGN.  Hopeful you would be able or a nurse be able to help me understand the results and the size.       Thank you,  Erika Goyal

## 2025-02-12 ENCOUNTER — PATIENT MESSAGE (OUTPATIENT)
Dept: OBGYN CLINIC | Facility: CLINIC | Age: 50
End: 2025-02-12

## 2025-02-12 ENCOUNTER — PATIENT MESSAGE (OUTPATIENT)
Facility: CLINIC | Age: 50
End: 2025-02-12

## 2025-02-12 DIAGNOSIS — N92.6 IRREGULAR MENSES: ICD-10-CM

## 2025-02-12 RX ORDER — DROSPIRENONE AND ETHINYL ESTRADIOL 0.02-3(28)
1 KIT ORAL DAILY
Qty: 84 TABLET | Refills: 3 | Status: SHIPPED | OUTPATIENT
Start: 2025-02-12

## 2025-02-12 RX ORDER — DROSPIRENONE AND ETHINYL ESTRADIOL 0.02-3(28)
1 KIT ORAL DAILY
Qty: 84 TABLET | Refills: 3 | OUTPATIENT
Start: 2025-02-12

## 2025-02-13 NOTE — TELEPHONE ENCOUNTER
Last visit 09/16/2024 Dr. Ye  A/P: Patient is 48 year old female      1. Irregular menses  - Drospirenone-Ethinyl Estradiol 3-0.02 MG Oral Tab; Take 1 tablet by mouth daily.  Dispense: 84 tablet; Refill: 3     2. Urinary frequency  - tolterodine (DETROL) 2 MG Oral Tab; Take 1 tablet (2 mg total) by mouth daily.  Dispense: 90 tablet; Refill: 3  - Urogynecology Referral - In Network     3. Encounter for gynecological examination without abnormal finding        Dayanna Garrison MD

## 2025-05-01 ENCOUNTER — TELEPHONE (OUTPATIENT)
Facility: CLINIC | Age: 50
End: 2025-05-01

## 2025-08-01 ENCOUNTER — TELEPHONE (OUTPATIENT)
Facility: CLINIC | Age: 50
End: 2025-08-01

## 2025-08-01 DIAGNOSIS — Z12.11 COLON CANCER SCREENING: Primary | ICD-10-CM

## 2025-08-13 ENCOUNTER — OFFICE VISIT (OUTPATIENT)
Dept: OBGYN CLINIC | Facility: CLINIC | Age: 50
End: 2025-08-13

## 2025-08-13 VITALS
DIASTOLIC BLOOD PRESSURE: 68 MMHG | HEIGHT: 67 IN | WEIGHT: 134 LBS | SYSTOLIC BLOOD PRESSURE: 108 MMHG | BODY MASS INDEX: 21.03 KG/M2

## 2025-08-13 DIAGNOSIS — Z01.419 ENCOUNTER FOR GYNECOLOGICAL EXAMINATION WITHOUT ABNORMAL FINDING: Primary | ICD-10-CM

## 2025-08-13 PROBLEM — Z15.89 MONOALLELIC MUTATION OF CHEK2 GENE IN FEMALE PATIENT: Chronic | Status: ACTIVE | Noted: 2019-06-27

## 2025-08-13 PROBLEM — Z15.01 MONOALLELIC MUTATION OF CHEK2 GENE IN FEMALE PATIENT: Chronic | Status: ACTIVE | Noted: 2019-06-27

## 2025-08-13 PROBLEM — Z15.02 MONOALLELIC MUTATION OF CHEK2 GENE IN FEMALE PATIENT: Chronic | Status: ACTIVE | Noted: 2019-06-27

## 2025-08-13 PROBLEM — Z15.09 MONOALLELIC MUTATION OF CHEK2 GENE IN FEMALE PATIENT: Chronic | Status: ACTIVE | Noted: 2019-06-27

## 2025-08-13 PROBLEM — Q51.3 BICORNUATE UTERUS: Chronic | Status: ACTIVE | Noted: 2020-08-28

## 2025-08-13 PROCEDURE — 88175 CYTOPATH C/V AUTO FLUID REDO: CPT | Performed by: OBSTETRICS & GYNECOLOGY

## 2025-08-13 PROCEDURE — 99396 PREV VISIT EST AGE 40-64: CPT | Performed by: OBSTETRICS & GYNECOLOGY

## 2025-08-13 PROCEDURE — 87624 HPV HI-RISK TYP POOLED RSLT: CPT | Performed by: OBSTETRICS & GYNECOLOGY

## 2025-08-14 LAB — HPV E6+E7 MRNA CVX QL NAA+PROBE: NEGATIVE

## (undated) DEVICE — KIT ENDO ORCAPOD 160/180/190

## (undated) DEVICE — SYRINGE, LUER SLIP, STERILE, 60ML: Brand: MEDLINE

## (undated) DEVICE — STERILE SURGICAL LUBRICANT, METAL TUBE: Brand: SURGILUBE

## (undated) DEVICE — KIT CLEAN ENDOKIT 1.1OZ GOWNX2

## (undated) DEVICE — ENCORE® LATEX MICRO SIZE 7, STERILE LATEX POWDER-FREE SURGICAL GLOVE: Brand: ENCORE

## (undated) DEVICE — SET TUBI Y FL CNTRL INFL/OTFL

## (undated) DEVICE — HYSTEROSCOPY: Brand: MEDLINE INDUSTRIES, INC.

## (undated) DEVICE — SOCK CNSTR 4IN TNPSL UNV SPEC

## (undated) DEVICE — V2 SPECIMEN COLLECTION MANIFOLD KIT: Brand: NEPTUNE

## (undated) DEVICE — MYOSURE LITE BLADE

## (undated) DEVICE — SOL  .9 3000ML

## (undated) DEVICE — MYOSURE SINGLE USE SEAL SET

## (undated) DEVICE — ENCORE® LATEX ACCLAIM SIZE 6.5, STERILE LATEX POWDER-FREE SURGICAL GLOVE: Brand: ENCORE

## (undated) DEVICE — CO2 CANNULA,SSOFT,ADLT,7O2,4CO2,FEMALE: Brand: MEDLINE

## (undated) DEVICE — MEDI-VAC NON-CONDUCTIVE SUCTION TUBING 6MM X 1.8M (6FT.) L: Brand: CARDINAL HEALTH

## (undated) NOTE — LETTER
[facility]  [address], [place], [place]    Authorization for Surgical Operation and Procedure                               I hereby authorize WHIT Howe MD, my physician and his/her assistants (if applicable), which may include medical students, residents, and/or fellows, to perform the following surgical operation/ procedure and administer such anesthesia as may be determined necessary by my physician: Operation/Procedure name (s) COLONOSCOPY on Erika Goyal   2.   I recognize that during the surgical operation/procedure, unforeseen conditions may necessitate additional or different procedures than those listed above.  I, therefore, further authorize and request that the above-named surgeon, assistants, or designees perform such procedures as are, in their judgment, necessary and desirable.    3.   My surgeon/physician has discussed prior to my surgery the potential benefits, risks and side effects of this procedure; the likelihood of achieving goals; and potential problems that might occur during recuperation.  They also discussed reasonable alternatives to the procedure, including risks, benefits, and side effects related to the alternatives and risks related to not receiving this procedure.  I have had all my questions answered and I acknowledge that no guarantee has been made as to the result that may be obtained.    4.   Should the need arise during my operation/procedure, which includes change of level of care prior to discharge, I also consent to the administration of blood and/or blood products.  Further, I understand that despite careful testing and screening of blood or blood products by collecting agencies, I may still be subject to ill effects as a result of receiving a blood transfusion and/or blood products.  The following are some, but not all, of the potential risks that can occur: fever and allergic reactions, hemolytic reactions, transmission of diseases such as  Hepatitis, AIDS and Cytomegalovirus (CMV) and fluid overload.  In the event that I wish to have an autologous transfusion of my own blood, or a directed donor transfusion, I will discuss this with my physician.  Check only if Refusing Blood or Blood Products  I understand refusal of blood or blood products as deemed necessary by my physician may have serious consequences to my condition to include possible death. I hereby assume responsibility for my refusal and release the hospital, its personnel, and my physicians from any responsibility for the consequences of my refusal.    o  Refuse   5.   I authorize the use of any specimen, organs, tissues, body parts or foreign objects that may be removed from my body during the operation/procedure for diagnosis, research or teaching purposes and their subsequent disposal by hospital authorities.  I also authorize the release of specimen test results and/or written reports to my treating physician on the hospital medical staff or other referring or consulting physicians involved in my care, at the discretion of the Pathologist or my treating physician.    6.   I consent to the photographing or videotaping of the operations or procedures to be performed, including appropriate portions of my body for medical, scientific, or educational purposes, provided my identity is not revealed by the pictures or by descriptive texts accompanying them.  If the procedure has been photographed/videotaped, the surgeon will obtain the original picture, image, videotape or CD.  The hospital will not be responsible for storage, release or maintenance of the picture, image, tape or CD.    7.   I consent to the presence of a  or observers in the operating room as deemed necessary by my physician or their designees.    8.   I recognize that in the event my procedure results in extended X-Ray/fluoroscopy time, I may develop a skin reaction.    9. If I have a Do Not Attempt  Resuscitation (DNAR) order in place, that status will be suspended while in the operating room, procedural suite, and during the recovery period unless otherwise explicitly stated by me (or a person authorized to consent on my behalf). The surgeon or my attending physician will determine when the applicable recovery period ends for purposes of reinstating the DNAR order.  10. Patients having a sterilization procedure: I understand that if the procedure is successful the results will be permanent and it will therefore be impossible for me to inseminate, conceive, or bear children.  I also understand that the procedure is intended to result in sterility, although the result has not been guaranteed.   11. I acknowledge that my physician has explained sedation/analgesia administration to me including the risk and benefits I consent to the administration of sedation/analgesia as may be necessary or desirable in the judgment of my physician.    I CERTIFY THAT I HAVE READ AND FULLY UNDERSTAND THE ABOVE CONSENT TO OPERATION and/or OTHER PROCEDURE.     ____________________________________  _________________________________        ______________________________  Signature of Patient    Signature of Responsible Person                Printed Name of Responsible Person                                      ____________________________________  _____________________________                ________________________________  Signature of Witness        Date  Time         Relationship to Patient    STATEMENT OF PHYSICIAN My signature below affirms that prior to the time of the procedure; I have explained to the patient and/or his/her legal representative, the risks and benefits involved in the proposed treatment and any reasonable alternative to the proposed treatment. I have also explained the risks and benefits involved in refusal of the proposed treatment and alternatives to the proposed treatment and have answered the patient's  questions. If I have a significant financial interest in a co-management agreement or a significant financial interest in any product or implant, or other significant relationship used in this procedure/surgery, I have disclosed this and had a discussion with my patient.     _____________________________________________________              _____________________________  (Signature of Physician)                                                                                         (Date)                                   (Time)  Patient Name: Erika Goyal      : 10/12/1975      Printed: 10/15/2024     Medical Record #: [id number]                                      Page 1 of 1

## (undated) NOTE — LETTER
5/5/2023              [facility] [address]         To Whom It May Concern,    This letter is to appeal the denial for lisdexamfetamine (VYVANSE) 30 MG Oral Cap. I certify that she is under my medical care & I verify her history of  ADHD, diagnosis code F98.8. Please let me know if you have any questions.        Sincerely,    DO Breanne Elena [address] [place] [address], Critical access hospital  Moises Mistry  [phone]

## (undated) NOTE — LETTER
[facility]  Administration of Anesthesia  I, Erika Goyal agree to be cared for by a physician anesthesiologist alone and/or with a nurse anesthetist, who is specially trained to monitor me and give me medicine to put me to sleep or keep me comfortable during my procedure    I understand that my anesthesiologist and/or anesthetist is not an employee or agent of [facility] or [employer]. He or she works for [facility]    As the patient asking for anesthesia services, I agree to:  Allow the anesthesiologist (anesthesia doctor) to give me medicine and do additional procedures as necessary. Some examples are: Starting or using an “IV” to give me medicine, fluids or blood during my procedure, and having a breathing tube placed to help me breathe when I’m asleep (intubation). In the event that my heart stops working properly, I understand that my anesthesiologist will make every effort to sustain my life, unless otherwise directed by [facility] Do Not Resuscitate documents.  Tell my anesthesia doctor before my procedure:  If I am pregnant.  The last time that I ate or drank.  iii. All of the medicines I take (including prescriptions, herbal supplements, and pills I can buy without a prescription (including street drugs/illegal medications). Failure to inform my anesthesiologist about these medicines may increase my risk of anesthetic complications.  iv.If I am allergic to anything or have had a reaction to anesthesia before.  I understand how the anesthesia medicine will help me (benefits).  I understand that with any type of anesthesia medicine there are risks:  The most common risks are: nausea, vomiting, sore throat, muscle soreness, damage to my eyes, mouth, or teeth (from breathing tube placement).  Rare risks include: remembering what happened during my procedure, allergic reactions to medications, injury to my airway, heart, lungs, vision, nerves, or  muscles and in extremely rare instances death.  My doctor has explained to me other choices available to me for my care (alternatives).  Pregnant Patients (“epidural”):  I understand that the risks of having an epidural (medicine given into my back to help control pain during labor), include itching, low blood pressure, difficulty urinating, headache or slowing of the baby’s heart. Very rare risks include infection, bleeding, seizure, irregular heart rhythms and nerve injury.  Regional Anesthesia (“spinal”, “epidural”, & “nerve blocks”):  I understand that rare but potential complications include headache, bleeding, infection, seizure, irregular heart rhythms, and nerve injury.    _____________________________________________________________________________  Patient (or Representative) Signature/Relationship to Patient  Date   Time    _____________________________________________________________________________   Name (if used)    Language/Organization   Time    _____________________________________________________________________________  Nurse Anesthetist Signature     Date   Time  _____________________________________________________________________________  Anesthesiologist Signature     Date   Time  I have discussed the procedure and information above with the patient (or patient’s representative) and answered their questions. The patient or their representative has agreed to have anesthesia services.    _____________________________________________________________________________  Witness        Date   Time  I have verified that the signature is that of the patient or patient’s representative, and that it was signed before the procedure  Patient Name: Erika Goyal     : 10/12/1975                 Printed: 10/15/2024 at 7:07 AM    Medical Record #: [id number]                                            Page 1 of 1  ----------ANESTHESIA CONSENT----------